# Patient Record
Sex: FEMALE | Race: WHITE | Employment: PART TIME | URBAN - METROPOLITAN AREA
[De-identification: names, ages, dates, MRNs, and addresses within clinical notes are randomized per-mention and may not be internally consistent; named-entity substitution may affect disease eponyms.]

---

## 2017-07-13 ENCOUNTER — OFFICE VISIT (OUTPATIENT)
Dept: ORTHOPEDIC SURGERY | Age: 37
End: 2017-07-13

## 2017-07-13 ENCOUNTER — TELEPHONE (OUTPATIENT)
Dept: ORTHOPEDIC SURGERY | Age: 37
End: 2017-07-13

## 2017-07-13 VITALS — WEIGHT: 130 LBS | RESPIRATION RATE: 16 BRPM | BODY MASS INDEX: 24.55 KG/M2 | HEIGHT: 61 IN

## 2017-07-13 DIAGNOSIS — R52 PAIN: ICD-10-CM

## 2017-07-13 DIAGNOSIS — M25.362 PATELLAR INSTABILITY OF LEFT KNEE: Primary | ICD-10-CM

## 2017-07-13 PROCEDURE — 99213 OFFICE O/P EST LOW 20 MIN: CPT | Performed by: ORTHOPAEDIC SURGERY

## 2017-07-13 PROCEDURE — 73562 X-RAY EXAM OF KNEE 3: CPT | Performed by: ORTHOPAEDIC SURGERY

## 2017-07-13 RX ORDER — VENLAFAXINE HYDROCHLORIDE 37.5 MG/1
CAPSULE, EXTENDED RELEASE ORAL
COMMUNITY
Start: 2017-05-03 | End: 2017-08-28

## 2017-07-13 RX ORDER — ESCITALOPRAM OXALATE 10 MG/1
TABLET ORAL
COMMUNITY
Start: 2017-04-13 | End: 2017-08-22 | Stop reason: ALTCHOICE

## 2017-07-13 ASSESSMENT — ENCOUNTER SYMPTOMS
RESPIRATORY NEGATIVE: 1
GASTROINTESTINAL NEGATIVE: 1
EYES NEGATIVE: 1

## 2017-08-22 ENCOUNTER — OFFICE VISIT (OUTPATIENT)
Dept: ORTHOPEDIC SURGERY | Age: 37
End: 2017-08-22

## 2017-08-22 VITALS
BODY MASS INDEX: 24.55 KG/M2 | HEIGHT: 61 IN | RESPIRATION RATE: 16 BRPM | HEART RATE: 66 BPM | WEIGHT: 130 LBS | DIASTOLIC BLOOD PRESSURE: 77 MMHG | SYSTOLIC BLOOD PRESSURE: 119 MMHG

## 2017-08-22 DIAGNOSIS — M25.362 PATELLAR INSTABILITY OF LEFT KNEE: Primary | ICD-10-CM

## 2017-08-22 PROCEDURE — 99213 OFFICE O/P EST LOW 20 MIN: CPT | Performed by: ORTHOPAEDIC SURGERY

## 2017-08-22 RX ORDER — OXYCODONE HYDROCHLORIDE AND ACETAMINOPHEN 5; 325 MG/1; MG/1
TABLET ORAL
Qty: 40 TABLET | Refills: 0 | Status: SHIPPED | OUTPATIENT
Start: 2017-08-22 | End: 2018-01-25 | Stop reason: ALTCHOICE

## 2017-08-22 RX ORDER — ONDANSETRON 4 MG/1
4 TABLET, FILM COATED ORAL EVERY 8 HOURS PRN
Qty: 5 TABLET | Refills: 0 | Status: SHIPPED | OUTPATIENT
Start: 2017-08-22 | End: 2018-01-25 | Stop reason: ALTCHOICE

## 2017-08-28 ENCOUNTER — HOSPITAL ENCOUNTER (OUTPATIENT)
Dept: PREADMISSION TESTING | Age: 37
Discharge: HOME | End: 2017-08-28
Attending: ORTHOPAEDIC SURGERY | Admitting: ORTHOPAEDIC SURGERY

## 2017-08-28 VITALS
BODY MASS INDEX: 27.28 KG/M2 | TEMPERATURE: 98.2 F | HEART RATE: 72 BPM | SYSTOLIC BLOOD PRESSURE: 112 MMHG | WEIGHT: 144.5 LBS | OXYGEN SATURATION: 98 % | HEIGHT: 61 IN | RESPIRATION RATE: 16 BRPM | DIASTOLIC BLOOD PRESSURE: 69 MMHG

## 2017-08-28 LAB
ANION GAP SERPL CALCULATED.3IONS-SCNC: 13 MMOL/L (ref 4–16)
BASOPHILS ABSOLUTE: 0.1 K/CU MM
BASOPHILS RELATIVE PERCENT: 0.6 % (ref 0–1)
BUN BLDV-MCNC: 10 MG/DL (ref 6–23)
CALCIUM SERPL-MCNC: 9.3 MG/DL (ref 8.3–10.6)
CHLORIDE BLD-SCNC: 101 MMOL/L (ref 99–110)
CO2: 22 MMOL/L (ref 21–32)
CREAT SERPL-MCNC: 0.7 MG/DL (ref 0.6–1.1)
DIFFERENTIAL TYPE: ABNORMAL
EOSINOPHILS ABSOLUTE: 0.1 K/CU MM
EOSINOPHILS RELATIVE PERCENT: 1 % (ref 0–3)
GFR AFRICAN AMERICAN: >60 ML/MIN/1.73M2
GFR NON-AFRICAN AMERICAN: >60 ML/MIN/1.73M2
GLUCOSE BLD-MCNC: 91 MG/DL (ref 70–140)
HCT VFR BLD CALC: 42.9 % (ref 37–47)
HEMOGLOBIN: 14 GM/DL (ref 12.5–16)
IMMATURE NEUTROPHIL %: 0.4 % (ref 0–0.43)
LYMPHOCYTES ABSOLUTE: 2.9 K/CU MM
LYMPHOCYTES RELATIVE PERCENT: 32.5 % (ref 24–44)
MCH RBC QN AUTO: 31.7 PG (ref 27–31)
MCHC RBC AUTO-ENTMCNC: 32.6 % (ref 32–36)
MCV RBC AUTO: 97.3 FL (ref 78–100)
MONOCYTES ABSOLUTE: 0.4 K/CU MM
MONOCYTES RELATIVE PERCENT: 4.7 % (ref 0–4)
NUCLEATED RBC %: 0 %
PDW BLD-RTO: 12.9 % (ref 11.7–14.9)
PLATELET # BLD: 280 K/CU MM (ref 140–440)
PMV BLD AUTO: 11.2 FL (ref 7.5–11.1)
POTASSIUM SERPL-SCNC: 4.6 MMOL/L (ref 3.5–5.1)
RBC # BLD: 4.41 M/CU MM (ref 4.2–5.4)
SEGMENTED NEUTROPHILS ABSOLUTE COUNT: 5.5 K/CU MM
SEGMENTED NEUTROPHILS RELATIVE PERCENT: 60.8 % (ref 36–66)
SODIUM BLD-SCNC: 136 MMOL/L (ref 135–145)
TOTAL IMMATURE NEUTOROPHIL: 0.04 K/CU MM
TOTAL NUCLEATED RBC: 0 K/CU MM
WBC # BLD: 9.1 K/CU MM (ref 4–10.5)

## 2017-08-28 ASSESSMENT — PAIN SCALES - GENERAL: PAINLEVEL_OUTOF10: 0

## 2017-08-29 ENCOUNTER — TELEPHONE (OUTPATIENT)
Dept: ORTHOPEDIC SURGERY | Age: 37
End: 2017-08-29

## 2017-08-29 DIAGNOSIS — M25.362 PATELLAR INSTABILITY OF LEFT KNEE: Primary | ICD-10-CM

## 2017-08-30 ENCOUNTER — HOSPITAL ENCOUNTER (OUTPATIENT)
Dept: SURGERY | Age: 37
Discharge: HOME | End: 2017-08-30
Attending: ORTHOPAEDIC SURGERY | Admitting: ORTHOPAEDIC SURGERY

## 2017-08-30 VITALS
OXYGEN SATURATION: 97 % | WEIGHT: 146 LBS | BODY MASS INDEX: 27.56 KG/M2 | DIASTOLIC BLOOD PRESSURE: 78 MMHG | RESPIRATION RATE: 16 BRPM | HEIGHT: 61 IN | SYSTOLIC BLOOD PRESSURE: 116 MMHG | TEMPERATURE: 98 F | HEART RATE: 97 BPM

## 2017-08-30 PROCEDURE — 27422 REVISION OF UNSTABLE KNEECAP: CPT | Performed by: ORTHOPAEDIC SURGERY

## 2017-08-30 PROCEDURE — 29877 ARTHRS KNEE SURG DBRDMT/SHVG: CPT | Performed by: ORTHOPAEDIC SURGERY

## 2017-08-30 RX ORDER — DIPHENHYDRAMINE HYDROCHLORIDE 50 MG/ML
12.5 INJECTION INTRAMUSCULAR; INTRAVENOUS
Status: ACTIVE | OUTPATIENT
Start: 2017-08-30 | End: 2017-08-30

## 2017-08-30 RX ORDER — SODIUM CHLORIDE, SODIUM LACTATE, POTASSIUM CHLORIDE, CALCIUM CHLORIDE 600; 310; 30; 20 MG/100ML; MG/100ML; MG/100ML; MG/100ML
INJECTION, SOLUTION INTRAVENOUS CONTINUOUS
Status: DISCONTINUED | OUTPATIENT
Start: 2017-08-30 | End: 2017-08-31 | Stop reason: HOSPADM

## 2017-08-30 RX ORDER — DOCUSATE SODIUM 100 MG/1
100 CAPSULE, LIQUID FILLED ORAL 2 TIMES DAILY
Status: DISCONTINUED | OUTPATIENT
Start: 2017-08-30 | End: 2017-08-31 | Stop reason: HOSPADM

## 2017-08-30 RX ORDER — FENTANYL CITRATE 50 UG/ML
25 INJECTION, SOLUTION INTRAMUSCULAR; INTRAVENOUS EVERY 5 MIN PRN
Status: DISCONTINUED | OUTPATIENT
Start: 2017-08-30 | End: 2017-08-31 | Stop reason: HOSPADM

## 2017-08-30 RX ORDER — SODIUM CHLORIDE 0.9 % (FLUSH) 0.9 %
10 SYRINGE (ML) INJECTION PRN
Status: DISCONTINUED | OUTPATIENT
Start: 2017-08-30 | End: 2017-08-31 | Stop reason: HOSPADM

## 2017-08-30 RX ORDER — SODIUM CHLORIDE 0.9 % (FLUSH) 0.9 %
10 SYRINGE (ML) INJECTION EVERY 12 HOURS SCHEDULED
Status: DISCONTINUED | OUTPATIENT
Start: 2017-08-30 | End: 2017-08-31 | Stop reason: HOSPADM

## 2017-08-30 RX ORDER — OXYCODONE HYDROCHLORIDE AND ACETAMINOPHEN 5; 325 MG/1; MG/1
1 TABLET ORAL EVERY 4 HOURS PRN
Status: DISCONTINUED | OUTPATIENT
Start: 2017-08-30 | End: 2017-08-31 | Stop reason: HOSPADM

## 2017-08-30 RX ORDER — ASPIRIN 325 MG
325 TABLET, DELAYED RELEASE (ENTERIC COATED) ORAL DAILY
Qty: 10 TABLET | Refills: 0 | Status: SHIPPED | OUTPATIENT
Start: 2017-08-30 | End: 2018-01-25 | Stop reason: ALTCHOICE

## 2017-08-30 RX ORDER — MEPERIDINE HYDROCHLORIDE 25 MG/ML
12.5 INJECTION INTRAMUSCULAR; INTRAVENOUS; SUBCUTANEOUS ONCE
Status: COMPLETED | OUTPATIENT
Start: 2017-08-30 | End: 2017-08-30

## 2017-08-30 RX ORDER — ONDANSETRON 2 MG/ML
4 INJECTION INTRAMUSCULAR; INTRAVENOUS
Status: ACTIVE | OUTPATIENT
Start: 2017-08-30 | End: 2017-08-30

## 2017-08-30 RX ORDER — OXYCODONE HYDROCHLORIDE AND ACETAMINOPHEN 5; 325 MG/1; MG/1
2 TABLET ORAL EVERY 4 HOURS PRN
Status: DISCONTINUED | OUTPATIENT
Start: 2017-08-30 | End: 2017-08-31 | Stop reason: HOSPADM

## 2017-08-30 RX ORDER — ONDANSETRON 2 MG/ML
4 INJECTION INTRAMUSCULAR; INTRAVENOUS EVERY 6 HOURS PRN
Status: DISCONTINUED | OUTPATIENT
Start: 2017-08-30 | End: 2017-08-31 | Stop reason: HOSPADM

## 2017-08-30 RX ADMIN — MEPERIDINE HYDROCHLORIDE 12.5 MG: 25 INJECTION INTRAMUSCULAR; INTRAVENOUS; SUBCUTANEOUS at 13:22

## 2017-08-30 RX ADMIN — Medication 0.5 MG: at 13:12

## 2017-08-30 RX ADMIN — OXYCODONE HYDROCHLORIDE AND ACETAMINOPHEN 2 TABLET: 5; 325 TABLET ORAL at 13:35

## 2017-08-30 RX ADMIN — Medication 0.5 MG: at 13:20

## 2017-08-30 RX ADMIN — SODIUM CHLORIDE, SODIUM LACTATE, POTASSIUM CHLORIDE, CALCIUM CHLORIDE: 600; 310; 30; 20 INJECTION, SOLUTION INTRAVENOUS at 09:54

## 2017-08-30 ASSESSMENT — PAIN DESCRIPTION - PAIN TYPE
TYPE: SURGICAL PAIN

## 2017-08-30 ASSESSMENT — PAIN DESCRIPTION - LOCATION
LOCATION: KNEE

## 2017-08-30 ASSESSMENT — PAIN SCALES - GENERAL
PAINLEVEL_OUTOF10: 9
PAINLEVEL_OUTOF10: 7
PAINLEVEL_OUTOF10: 10
PAINLEVEL_OUTOF10: 5
PAINLEVEL_OUTOF10: 9
PAINLEVEL_OUTOF10: 8
PAINLEVEL_OUTOF10: 10
PAINLEVEL_OUTOF10: 7

## 2017-08-30 ASSESSMENT — ENCOUNTER SYMPTOMS
DYSPNEA ACTIVITY LEVEL: NO INTERVAL CHANGE
SHORTNESS OF BREATH: 1

## 2017-08-30 ASSESSMENT — PAIN DESCRIPTION - DESCRIPTORS
DESCRIPTORS: ACHING;SHARP

## 2017-08-30 ASSESSMENT — PAIN DESCRIPTION - ORIENTATION
ORIENTATION: LEFT

## 2017-08-30 ASSESSMENT — PAIN - FUNCTIONAL ASSESSMENT: PAIN_FUNCTIONAL_ASSESSMENT: 0-10

## 2017-08-31 ENCOUNTER — HOSPITAL ENCOUNTER (OUTPATIENT)
Dept: PHYSICAL THERAPY | Age: 37
Discharge: HOME | End: 2017-08-31
Attending: ORTHOPAEDIC SURGERY | Admitting: ORTHOPAEDIC SURGERY

## 2017-08-31 ENCOUNTER — OFFICE VISIT (OUTPATIENT)
Dept: ORTHOPEDIC SURGERY | Age: 37
End: 2017-08-31

## 2017-08-31 VITALS — BODY MASS INDEX: 27.56 KG/M2 | WEIGHT: 146 LBS | RESPIRATION RATE: 16 BRPM | HEIGHT: 61 IN

## 2017-08-31 DIAGNOSIS — Z09 POSTOP CHECK: Primary | ICD-10-CM

## 2017-08-31 DIAGNOSIS — M25.362 PATELLAR INSTABILITY OF LEFT KNEE: ICD-10-CM

## 2017-08-31 PROCEDURE — 99024 POSTOP FOLLOW-UP VISIT: CPT | Performed by: ORTHOPAEDIC SURGERY

## 2017-08-31 ASSESSMENT — PAIN DESCRIPTION - LOCATION: LOCATION: KNEE

## 2017-08-31 ASSESSMENT — PAIN DESCRIPTION - PAIN TYPE: TYPE: SURGICAL PAIN

## 2017-08-31 ASSESSMENT — PAIN SCALES - GENERAL: PAINLEVEL_OUTOF10: 9

## 2017-08-31 ASSESSMENT — ACTIVITIES OF DAILY LIVING (ADL): EFFECT OF PAIN ON DAILY ACTIVITIES: ALL FUNCTIONAL MOBILITY

## 2017-08-31 ASSESSMENT — PAIN DESCRIPTION - ONSET: ONSET: SUDDEN

## 2017-08-31 ASSESSMENT — PAIN DESCRIPTION - PROGRESSION: CLINICAL_PROGRESSION: NOT CHANGED

## 2017-08-31 ASSESSMENT — PAIN DESCRIPTION - DESCRIPTORS: DESCRIPTORS: CONSTANT;SHARP

## 2017-08-31 ASSESSMENT — PAIN DESCRIPTION - ORIENTATION: ORIENTATION: LEFT;ANTERIOR

## 2017-08-31 ASSESSMENT — PAIN DESCRIPTION - FREQUENCY: FREQUENCY: CONTINUOUS

## 2017-09-01 ENCOUNTER — HOSPITAL ENCOUNTER (OUTPATIENT)
Dept: OTHER | Age: 37
Discharge: HOME | End: 2017-09-30
Attending: ORTHOPAEDIC SURGERY | Admitting: ORTHOPAEDIC SURGERY

## 2017-09-08 ENCOUNTER — TELEPHONE (OUTPATIENT)
Dept: ORTHOPEDIC SURGERY | Age: 37
End: 2017-09-08

## 2017-09-11 RX ORDER — OXYCODONE HYDROCHLORIDE AND ACETAMINOPHEN 5; 325 MG/1; MG/1
1 TABLET ORAL EVERY 8 HOURS PRN
Qty: 15 TABLET | Refills: 0 | Status: SHIPPED | OUTPATIENT
Start: 2017-09-11 | End: 2018-01-25 | Stop reason: ALTCHOICE

## 2017-09-12 ENCOUNTER — OFFICE VISIT (OUTPATIENT)
Dept: ORTHOPEDIC SURGERY | Age: 37
End: 2017-09-12

## 2017-09-12 VITALS — BODY MASS INDEX: 27.56 KG/M2 | RESPIRATION RATE: 16 BRPM | HEIGHT: 61 IN | WEIGHT: 146 LBS

## 2017-09-12 DIAGNOSIS — Z09 POSTOP CHECK: Primary | ICD-10-CM

## 2017-09-12 DIAGNOSIS — M25.362 PATELLAR INSTABILITY OF LEFT KNEE: ICD-10-CM

## 2017-09-12 PROCEDURE — 99024 POSTOP FOLLOW-UP VISIT: CPT | Performed by: ORTHOPAEDIC SURGERY

## 2017-09-21 ENCOUNTER — HOSPITAL ENCOUNTER (OUTPATIENT)
Dept: PHYSICAL THERAPY | Age: 37
Discharge: HOME | End: 2017-09-21
Admitting: ORTHOPAEDIC SURGERY

## 2017-10-01 ENCOUNTER — HOSPITAL ENCOUNTER (OUTPATIENT)
Dept: OTHER | Age: 37
Discharge: HOME | End: 2017-10-31
Attending: ORTHOPAEDIC SURGERY | Admitting: ORTHOPAEDIC SURGERY

## 2017-10-05 ENCOUNTER — HOSPITAL ENCOUNTER (OUTPATIENT)
Dept: PHYSICAL THERAPY | Age: 37
Discharge: HOME | End: 2017-10-05
Admitting: ORTHOPAEDIC SURGERY

## 2017-10-05 NOTE — FLOWSHEET NOTE
Outpatient Physical Therapy           [facility]           [] Phone: [phone]   Fax: [phone]  [facility] pat[facility]           [] Phone: [phone]   Fax: [phone]    Physical Therapy Daily Treatment Note  Date:  10/5/2017    Patient Name:  Jahaira Mascorro    :  1980  MRN: [id number]  Restrictions/Precautions: WBAT w/ brace  Diagnosis:   LEFT KNEE ARTHROSCOPY, CHONDROPLASTY, OPEN PROXIMAL PATELLA      Date of Surgery: 17  Treatment Diagnosis: L knee pain, stiffness. Gait dysfunction     Insurance/Certification information:  [employer]   Referring Physician:  Dr. Franky Esparza   Next Doctor Visit:  --  Plan of care signed (Y/N): YES  Visit# / total visits:   per new POC  Pain level: 0/10 currently    Goals:        Short term goals  Time Frame for Short term goals: 4 weeks 10/1/17  Short term goal 1: Pt will demo >100 deg knee flexion with full knee ext to ease return to stair negotiation. Mostly MET  Short term goal 2: Pt will have d/c crutches and ambulating with knee brace. Not MET  Short term goal 3: Pt reports >50% reduction in pain to ease sleeping. MET  Short term goal 4: Pt will demo good quad activation with min/no pain. Partially MET   Long term goals   Time Frame for Long term goals : 8 weeks 17 (All Goals NOT MET)  Long term goal 1: Pt goal: return to normal gait with no pain  Long term goal 2: Pt will demo I with HEP/symptom management. Long term goal 3: Pt will demo normal gait on even groud and stairs to ease functional mobility. Long term goal 4: Pt will demo >4+/5 knee/hip strength to ease squats.    Long term goal 5: Pt will demo full knee AROM to ease transfers        G-Code Selection: (On Eval and every 10th visit or Discharge)    MEASURE    [x] Mobility: Walking and Moving Around     [x] Current ()   [x] Goal ()   [] DC ()  [] Changing/Maintaining Body Position     [] Current (7390)      [] Goal ()   [] DC ()  [] Carrying / Moving / Handling Objects [] Current ()   [] Goal ()   [] DC ()  [] Self-Care     [] Current ()   [] Goal ()   [] DC ()  [] Other PT/OT primary DX     [] Current ()   [] Goal ()   [] DC ()    SEVERITY    CURRENT  GOAL  DISCHARGE   [] CH (0% Impaired, Indep.)  [] CI (1-19% Impaired, SBA-CGA)  [] CJ (20-39% Impaired, MIN A)  [] CK  (40-59% Impairment, Mod A)  [x] CL  (60-79% Impairment, Max A)  [] CM  (80-99% Impairment, Dep.)   [] CN  (100% Impairment, Tot Dep.) [] CH (0% Impaired, Indep.)  [] CI (1-19% Impaired, SBA-CGA)  [x] CJ (20-39% Impaired, MIN A)  [] CK  (40-59% Impairment, Mod A)  [] CL  (60-79% Impairment, Max A)  [] CM  (80-99% Impairment, Dep.)   [] CN  (100% Impairment, Tot Dep.)  [] CH (0% Impaired, Indep.)  [] CI (1-19% Impaired, SBA-CGA)  [] CJ (20-39% Impaired, MIN A)  [] CK  (40-59% Impairment, Mod A)  [] CL  (60-79% Impairment, Max A)  [] CM  (80-99% Impairment, Dep.)   [] CN  (100% Impairment, Tot Dep.)          Subjective: Pt stated that she is currently no pain. Currently having R hip soreness. Notes having the next couple of days off. Increased pain after last visit at 4-5/10 with having to work later that night. Any changes in Ambulatory Summary Sheet? no    Objective:    Brace locked at 0 deg with B crutches entering therapy   Completed SAQ to 30 deg to reduce pain and pt sensation of pop   Observed fatigue and lack control with SAQ intermittent, improved with reps.           Exercises:  Exercise/Equipment 9/26/17 9/28/17 10/3/17 10/5/17   Nu-step -- -- 6'  Lv 1 Lv1  5'    Calf stretch man man     Calf raises  10x2 10x2       Quad set 10x2 5\"  10x2 5\"  10x2  3\"    Weight shifting  X 20 f/b and S/S 1' ea dir      Heelslide w/ sheet 10x2  10x2 GSB 10x2  RSB 10x2     SLR:   Flex  ABD  ext   In brace   ecentric lowering for SLR x 25  SL hip abd x 20  Prone ext x20 In brace   ecentric lowering for SLR x 25  SL hip abd x 20  Prone ext x20       Sitting at edge of bed PROM to 110 deg         AAROM LAQ  10x2 X 25 10x2      Sitting marches x25 x25 10x2 1# 10x     iso hamstring in sitting at 45 deg knee flexion          SAQ Black wedge w/assist  2x10 w/ eccentric lowering Black wedge w/assist 2x10  Black wedge w/assist 2x10  Black wedge AROM 2x10      Fitter knee ext    1 blue band 10x2 1 blue band 15x2    Bridge w/ RSB     10x2     LAQ    To 40 deg ext 10x2  1#      iso at 20 deg knee 10x2  3\"     Resisted hamstring curls    YTB 10x2                                         Other Therapeutic Activities/Education: --    Home Exercise Program:  Continue current program. Pt agreed to comply. Modality/intervention used:    [x] Therapeutic Exercise  [] Modalities:  [] Therapeutic Activity     [] Ultrasound  [x] Elec  Stim  [] Gait Training      [] Cervical Traction [] Lumbar Traction  [] Neuromuscular Re-education    [] Cold/hotpack [] Iontophoresis   [x] Instruction in HEP      [x] Vasopneumatic     [] Manual Therapy               [] Aquatic Therapy     Manual Treatments  --  Modalities:  Gameready to L knee in supine  36 deg, low pressure for pain management x 15'     Communication with other providers:  POC sent 8/31/17    Education provided to patient/caregiver:  Ice as needed. Adverse reactions to treatment:  --    Equipment provided:  --    Assessment:   Pt tolerated treatment fairly well Added additional exercises with light added resistance, appeared to tolerate well. Will assess for adverse effects next visit. Pt is making gains with control and strength of L LE. Pt given opportunity to increase frequency to 3x a week if wanted. Possible consideration per patient. Pt rated pain at 0-1/10 after vaso. Pt will continue to benefit from more strengthening to allow patient to return to PLOF.      Time In / Time Out:  1347/1438     Timed Code/Total Treatment Minutes: 41/51'    1 vaso ( 10')    3 TE (41')   Patients Report of Tolerance:    [] Patient limited by fatigue

## 2017-10-10 ENCOUNTER — OFFICE VISIT (OUTPATIENT)
Dept: ORTHOPEDIC SURGERY | Age: 37
End: 2017-10-10

## 2017-10-10 ENCOUNTER — HOSPITAL ENCOUNTER (OUTPATIENT)
Dept: PHYSICAL THERAPY | Age: 37
Discharge: HOME | End: 2017-10-10
Admitting: ORTHOPAEDIC SURGERY

## 2017-10-10 VITALS — HEIGHT: 61 IN | BODY MASS INDEX: 27.56 KG/M2 | RESPIRATION RATE: 16 BRPM | WEIGHT: 146 LBS

## 2017-10-10 DIAGNOSIS — M25.362 PATELLAR INSTABILITY OF LEFT KNEE: ICD-10-CM

## 2017-10-10 DIAGNOSIS — Z09 POSTOP CHECK: Primary | ICD-10-CM

## 2017-10-10 PROCEDURE — 99024 POSTOP FOLLOW-UP VISIT: CPT | Performed by: ORTHOPAEDIC SURGERY

## 2017-10-10 NOTE — PROGRESS NOTES
Scribe Authentication Statement  Fifi Yates, scribed portions of this documentation for and in the presence of Dr. Diamond Alba on 10/10/17 at 1:52 PM.    Provider Malik Ames M.D., personally performed the services described in this documentation and they were scribed in my presence by the above listed scribe. The documentation is both accurate and complete. lEi Gaston  [id number]  October 10, 2017    Chief Complaint   Patient presents with    Post-Op Check     left knee        Date of surgery:   August 30th 2017    History:  Ms. Eli Gaston is here in follow up regarding her left knee:  Left knee arthroscopic patellar chondroplasty,  open proximal patellar realignment.     She is doing ok, is still having 2-3/10 pain. She has not discontinued the assistive devices for ambulation. She is still wearing ROM brace locked in extension. She denies chest pain, SOB, calf pain. No other issues. The patient is still in therapy, and is still having trouble firing her quads. Physical: She demonstrates appropriate mood and affect. The left leg exam:  The incisions are clean, dry, intact and nontender and well  healed. She has mild swelling. There are No cords or calf tenderness. No significant calf/ankle edema. She is neurovascularly intact distally.   Knee ROM is 0-110, patella stable to lateral glide with 20% glide at 30 degrees of flexion    Impression: Status post above, mild weakness/quad inhibition     Plan:   Wean off crutches by this weekend  Wean off brace  By next weekend   continue the PT protocol with emphasis on ROM/strengthening  follow up in 4 weeks

## 2017-10-10 NOTE — FLOWSHEET NOTE
Outpatient Physical Therapy           [facility]           [] Phone: [phone]   Fax: [phone]  nimes[facility]           [] Phone: [phone]   Fax: [phone]    Physical Therapy Daily Treatment Note  Date:  10/10/2017    Patient Name:  Miguelito Salazar    :  1980  MRN: [id number]  Restrictions/Precautions: WBAT w/ brace  Diagnosis:   LEFT KNEE ARTHROSCOPY, CHONDROPLASTY, OPEN PROXIMAL PATELLA      Date of Surgery: 17  Treatment Diagnosis: L knee pain, stiffness. Gait dysfunction     Insurance/Certification information:  [employer]ussain   Referring Physician:  Dr. Stormy Coley   Next Doctor Visit:  --  Plan of care signed (Y/N): YES  Visit# / total visits:   per new POC  Pain level: 2/10 currently    Goals:        Short term goals  Time Frame for Short term goals: 4 weeks 10/1/17  Short term goal 1: Pt will demo >100 deg knee flexion with full knee ext to ease return to stair negotiation. Mostly MET  Short term goal 2: Pt will have d/c crutches and ambulating with knee brace. Not MET  Short term goal 3: Pt reports >50% reduction in pain to ease sleeping. MET  Short term goal 4: Pt will demo good quad activation with min/no pain. Partially MET   Long term goals   Time Frame for Long term goals : 8 weeks 17 (All Goals NOT MET)  Long term goal 1: Pt goal: return to normal gait with no pain  Long term goal 2: Pt will demo I with HEP/symptom management. Long term goal 3: Pt will demo normal gait on even groud and stairs to ease functional mobility. Long term goal 4: Pt will demo >4+/5 knee/hip strength to ease squats.    Long term goal 5: Pt will demo full knee AROM to ease transfers        G-Code Selection: (On Eval and every 10th visit or Discharge)    MEASURE    [x] Mobility: Walking and Moving Around     [x] Current ()   [x] Goal ()   [] DC ()  [] Changing/Maintaining Body Position     [] Current (0942)      [] Goal ()   [] DC ()  [] Carrying / Moving / Handling pain   [] Patient limited by other medical complications   [] Other:     Prognosis:   [x] Good [] Fair  [] Poor    Plan:   [x] Continue per plan of care [] Alter current plan (see comments)  [] Plan of care initiated [] Hold pending MD visit [] Discharge    Plan for Next Session:  follow protocol. AROM knee flex/ext and isometrics, manual PROM within tolerance. Target hips iso->AROM, DF flexibility. Nu-step if able. Gameready.          Next Progress Note due:  Radha 8/31/17    Visit 10            Electronically signed by: Stuart Squires PT 10/10/2017   6:58 AM      10/10/2017 6:58 AM

## 2017-10-10 NOTE — PATIENT INSTRUCTIONS
Wean off crutches by this weekend  Wean off brace  By next weekend   continue the PT protocol with emphasis on ROM  follow up in 4 weeks

## 2017-10-13 ENCOUNTER — HOSPITAL ENCOUNTER (OUTPATIENT)
Dept: PHYSICAL THERAPY | Age: 37
Discharge: HOME | End: 2017-10-13
Admitting: ORTHOPAEDIC SURGERY

## 2017-10-13 NOTE — FLOWSHEET NOTE
Lv1  5'  L 1 x 5'     Calf stretch       Calf raises          Quad set   W/ SLR and SAQ    Weight shifting         Heelslide w/ sheet RSB 10x2 RSB 10x2 20x   AAROM       SLR:   Flex  ABD  ext  AAROM 10x2 Brace locked for SLR 10x2  No brace for SL hip abd 10x2      Sitting at edge of bed PROM to 110 deg         AAROM LAQ          Sitting marches 1# 10x 2# 10x2 --      iso hamstring in sitting at 45 deg knee flexion          SAQ Black wedge AROM 2x10  Foam  6\" 10x2 w/ MA at end range    Eccentric lowering 10x2      Fitter knee ext  1 blue band 15x2 1 blue band 15x2 --     Bridge w/ RSB  10x2        LAQ To 40 deg ext 10x2  1#      iso at 20 deg knee 10x2  3\" To 40 deg ext 10x2  To 40 deg ext 10x2       Resisted hamstring curls YTB 10x2 YTB 10x2       Sit to stand from elevated table  10x2 Mini squat 10x2  With UE support     Shuttle Walking    1c 10x , 2c 10  1C  10x 2  2C 10x      GT training   In clinic w/ brace and no crutches x 2 laps with even stride and heel toe gt               Other Therapeutic Activities/Education:   Home Exercise Program:  Continue current program. Added light sit to stand. Pt agreed to comply. Modality/intervention used:    [x] Therapeutic Exercise  [] Modalities:  [] Therapeutic Activity     [] Ultrasound  [] Elec  Stim  [x] Gait Training      [] Cervical Traction [] Lumbar Traction  [] Neuromuscular Re-education    [] Cold/hotpack [] Iontophoresis   [x] Instruction in HEP      [] Vasopneumatic     [x] Manual Therapy               [] Aquatic Therapy     Manual Treatments AAROM for knee flexion x 10'   Modalities: Ice to go    Communication with other providers:  POC sent 8/31/17    Education provided to patient/caregiver:  Ice as needed. Adverse reactions to treatment:  --    Equipment provided:  --    Assessment:   Pt tolerated treatment fair. Pt voiced being anxious about weaning off crutches and brace. Pt continues to have decreased quad control with SLR and SAQ.  Pt was unable to lift L LE from off of shuttle and needed MA. Pt rated pain at 1/10 an stated that she had more confidence to walk without crutches now. Time In / Time Out:  1118/1218    Timed Code/Total Treatment Minutes:   60'/ 61' 1 man ( 10') 1 neuro ( 10') 1 GT ( 15') 1 TE ( 25')   Patients Report of Tolerance:    [] Patient limited by fatigue        [x] Patient limited by pain   [] Patient limited by other medical complications   [] Other:     Prognosis:   [x] Good [] Fair  [] Poor    Plan:   [x] Continue per plan of care [] Alter current plan (see comments)  [] Plan of care initiated [] Hold pending MD visit [] Discharge    Plan for Next Session:  follow protocol. AROM knee flex/ext and isometrics, manual PROM within tolerance. Target hips iso->AROM, DF flexibility. Nu-step if able. Gameready.          Next Progress Note due:  Radha 8/31/17    Visit 10            Electronically signed by: Juventino Herrera, VANESA 10/13/2017   9:56 AM          10/13/2017,5:31 PM

## 2017-10-17 ENCOUNTER — HOSPITAL ENCOUNTER (OUTPATIENT)
Dept: PHYSICAL THERAPY | Age: 37
Discharge: HOME | End: 2017-10-17
Admitting: ORTHOPAEDIC SURGERY

## 2017-10-17 NOTE — FLOWSHEET NOTE
Objects     [] Current ()   [] Goal ()   [] DC ()  [] Self-Care     [] Current ()   [] Goal ()   [] DC ()  [] Other PT/OT primary DX     [] Current ()   [] Goal ()   [] DC ()    SEVERITY    CURRENT  GOAL  DISCHARGE   [] CH (0% Impaired, Indep.)  [] CI (1-19% Impaired, SBA-CGA)  [] CJ (20-39% Impaired, MIN A)  [] CK  (40-59% Impairment, Mod A)  [x] CL  (60-79% Impairment, Max A)  [] CM  (80-99% Impairment, Dep.)   [] CN  (100% Impairment, Tot Dep.) [] CH (0% Impaired, Indep.)  [] CI (1-19% Impaired, SBA-CGA)  [x] CJ (20-39% Impaired, MIN A)  [] CK  (40-59% Impairment, Mod A)  [] CL  (60-79% Impairment, Max A)  [] CM  (80-99% Impairment, Dep.)   [] CN  (100% Impairment, Tot Dep.)  [] CH (0% Impaired, Indep.)  [] CI (1-19% Impaired, SBA-CGA)  [] CJ (20-39% Impaired, MIN A)  [] CK  (40-59% Impairment, Mod A)  [] CL  (60-79% Impairment, Max A)  [] CM  (80-99% Impairment, Dep.)   [] CN  (100% Impairment, Tot Dep.)          Subjective: Pt stated that her pain was 1/10 today. Pt states has been walking without crutches for past 4 days. States has been walking without brace in home. Did not ice yesterday. Completing HEP. Off from work this date. Wore brace and took single crutch at [facility] for a few hours with difficulty post. Difficulties with decline. Any changes in Ambulatory Summary Sheet? no    Objective:    Brace locked at 0-20 deg with no crutches entering therapy   Ambulated within tx without brace with slight antalgic gait. AROM knee flexion to 110 degrees on Shuttle.        Exercises:  Exercise/Equipment 10/5/17 10/10/17 10/13/17 10/17/17   Nu-step Lv1  5'  Lv1  5'  L 1 x 5'  L 1 x 5'    Calf stretch       Calf raises          Quad set   W/ SLR and SAQ    Weight shifting         Heelslide w/ sheet RSB 10x2 RSB 10x2 20x   AAROM       SLR:   Flex  ABD  ext  AAROM 10x2 Brace locked for SLR 10x2  No brace for SL hip abd 10x2      Sitting at edge of

## 2017-10-20 ENCOUNTER — HOSPITAL ENCOUNTER (OUTPATIENT)
Dept: PHYSICAL THERAPY | Age: 37
Discharge: HOME | End: 2017-10-20
Admitting: ORTHOPAEDIC SURGERY

## 2017-10-20 NOTE — FLOWSHEET NOTE
Quad set     W/ SLR and SAQ      Weight shifting              Heelslide w/ sheet RSB 10x2 RSB 10x2 20x   AAROM         SLR:   Flex  ABD  ext   AAROM 10x2 Brace locked for SLR 10x2  No brace for SL hip abd 10x2        Sitting at edge of bed PROM to 110 deg              AAROM LAQ               Sitting marches 1# 10x 2# 10x2 -- 3# 15x2 3# 15x 2     iso hamstring in sitting at 45 deg knee flexion               SAQ Black wedge AROM 2x10  Foam  6\" 10x2 w/ MA at end range     Eccentric lowering 10x2 10x2 to end range  10 x 2 to end range     Fitter knee ext  1 blue band 15x2 1 blue band 15x2 --       Bridge w/ RSB  10x2            LAQ To 40 deg ext 10x2  1#        iso at 20 deg knee 10x2  3\" To 40 deg ext 10x2  To 40 deg ext 10x2  To 40 deg ext 15x2   3#       Resisted hamstring curls YTB 10x2 YTB 10x2          Sit to stand from elevated table   10x2 Mini squat 10x2  With UE support       Shuttle Walking     1c 10x , 2c 10  1C  10x 2  2C 10x        GT training     In clinic w/ brace and no crutches x 2 laps with even stride and heel toe gt        Shuttle Leg press       2c 15x2 2c 15x2   FT bwd walking        2 plts 4x 2 plts 4x   Marches on Airex       30x2 30 x2   DF calf stretch        FR 20\"x4 FR 20\"x4                   Other Therapeutic Activities/Education: Continue walking as tolerated. Education given regarding increased pain and use of brace if pain elevates significantly.  Pt reporting pain / swelling with education regarding icing and elevating.      Home Exercise Program:  Continue current program. Pt agreed to comply.       Modality/intervention used:    [x] Therapeutic Exercise                                 [] Modalities:  [] Therapeutic Activity                                                         [] Ultrasound                                      [] Elec  Stim  [] Gait Training                                                                              [] Cervical Traction                  [] Lumbar Traction  [] Neuromuscular Re-education                                            [] Cold/hotpack            [] Iontophoresis             [x] Instruction in HEP                                                                                  [x] Vasopneumatic     [] Manual Therapy                                                                                              [] Aquatic Therapy                        Manual Treatments --     Modalities:  Gameready to L knee in supine  36 deg, med pressure for pain management x 10'      Communication with other providers:  POC sent 8/31/17     Education provided to patient/caregiver:  Ice as needed.      Adverse reactions to treatment:  --     Equipment provided:  --     Assessment:    Pt tolerated treatment well. Pt now able to ambulate without brace. Pt continues to demonstrate weakness in bilateral knees and will continue to address going forward to improve functional mobility for ambulation and working. Continue per POC. Pt rated pain p rx 0/10 (numb).      Time In / Time Out:  9634-1108       Timed Code/Total Treatment Minutes:    TE 35 (2), Vaso 10 (1)     Patients Report of Tolerance:    [] Patient limited by fatigue        [x] Patient limited by min pain                [] Patient limited by other medical complications   [] Other:      Prognosis:               [x] Good            [] Fair                                     [] Poor     Plan:                [x] Continue per plan of care                [] Alter current plan (see comments)  [] Plan of care initiated            [] Hold pending MD visit                      [] Discharge     Plan for Next Session:  follow protocol. AROM knee flex/ext as able ->closed chain, Target hips iso->AROM, DF flexibility. Nu-step.  Gameready.           Next Progress Note due:  Radha 8/31/17    Visit 10              Electronically signed by: Darren Ott PTA [id number] 10/17/2017   6:49 AM            10/17/2017 6:49 AM

## 2017-10-23 ENCOUNTER — HOSPITAL ENCOUNTER (OUTPATIENT)
Dept: PHYSICAL THERAPY | Age: 37
Discharge: HOME | End: 2017-10-23
Admitting: ORTHOPAEDIC SURGERY

## 2017-10-23 NOTE — FLOWSHEET NOTE
Self-Care     [] Current ()   [] Goal ()   [] DC ()  [] Other PT/OT primary DX     [] Current ()   [] Goal ()   [] DC ()     SEVERITY     CURRENT  GOAL  DISCHARGE   [] CH                (0% Impaired, Indep.)  [] CI                  (1-19% Impaired, SBA-CGA)  [] CJ                 (20-39% Impaired, MIN A)  [] CK                (40-59% Impairment, Mod A)  [x] CL                 (60-79% Impairment, Max A)  [] CM                (80-99% Impairment, Dep.)   [] CN                (100% Impairment, Tot Dep.) [] CH                (0% Impaired, Indep.)  [] CI                  (1-19% Impaired, SBA-CGA)  [x] CJ                 (20-39% Impaired, MIN A)  [] CK                (40-59% Impairment, Mod A)  [] CL                 (60-79% Impairment, Max A)  [] CM                (80-99% Impairment, Dep.)   [] CN                (100% Impairment, Tot Dep.)  [] CH                (0% Impaired, Indep.)  [] CI                  (1-19% Impaired, SBA-CGA)  [] CJ                 (20-39% Impaired, MIN A)  [] CK                (40-59% Impairment, Mod A)  [] CL                 (60-79% Impairment, Max A)  [] CM                (80-99% Impairment, Dep.)   [] CN                (100% Impairment, Tot Dep. )             Subjective: Pt  Stated that her pain was 2/10 today. Pt stated that she stopped wearing the brace last Thursday. Pt stated that she twisted her knee again while sleeping last night and that it was really painful.     Any changes in Ambulatory Summary Sheet? no     Objective:    Ambulated into clinic without brace with mod limp    AROM knee flexion to 123 degrees        Exercises:  Exercise/Equipment 10/5/17 10/10/17 10/13/17 10/17/17 10/20/2017 10/23/17   Nu-step Lv1  5'  Lv1  5'  L 1 x 5'  L 1 x 5'  L1 x5' L1 x5'   Calf stretch             Calf raises                Quad set     W/ SLR and SAQ    W/ SLR and SAQ   Weight shifting               Heelslide w/ sheet RSB 10x2 RSB 10x2 20x   AAROM     20x AAROM     SLR: Flex  ABD  ext   AAROM 10x2 Brace locked for SLR 10x2  No brace for SL hip abd 10x2    10x2 ea dir w/ MA PRN to keep extension     Sitting at edge of bed PROM to 110 deg               AAROM LAQ                Sitting marches 1# 10x 2# 10x2 -- 3# 15x2 3# 15x 2 3# 15x2     iso hamstring in sitting at 45 deg knee flexion                SAQ Black wedge AROM 2x10  Foam  6\" 10x2 w/ MA at end range     Eccentric lowering 10x2 10x2 to end range  10 x 2 to end range 10 x 2 to end range     Fitter knee ext  1 blue band 15x2 1 blue band 15x2 --        Bridge w/ RSB  10x2             LAQ To 40 deg ext 10x2  1#        iso at 20 deg knee 10x2  3\" To 40 deg ext 10x2  To 40 deg ext 10x2  To 40 deg ext 15x2   3#     To 40 deg ext   3#  15x2     Resisted hamstring curls YTB 10x2 YTB 10x2           Sit to stand from elevated table   10x2 Mini squat 10x2  With UE support        Shuttle Walking     1c 10x , 2c 10  1C  10x 2  2C 10x         GT training     In clinic w/ brace and no crutches x 2 laps with even stride and heel toe gt         Shuttle Leg press       2c 15x2 2c 15x2 2c 15x2   FT bwd walking        2 plts 4x 2 plts 4x  2 plts 5x   Marches on Airex       30x2 30 x2 30\"x2   DF calf stretch        FR 20\"x4 FR 20\"x4 1/2 roll 30\"x2                    Other Therapeutic Activities/Education: Continue walking as tolerated. Education given regarding increased pain and use of brace if pain elevates significantly.  Pt reporting pain / swelling with education regarding icing and elevating.      Home Exercise Program:  Continue current program. Pt agreed to comply.       Modality/intervention used:    [x] Therapeutic Exercise                                 [] Modalities:  [] Therapeutic Activity                                                         [] Ultrasound                                      [] Elec  Stim  [] Gait Training                                                                              [] Cervical Traction [] Lumbar Traction  [] Neuromuscular Re-education                                            [] Cold/hotpack            [] Iontophoresis             [x] Instruction in HEP                                                                                  [x] Vasopneumatic     [] Manual Therapy                                                                                              [] Aquatic Therapy                        Manual Treatments --     Modalities:  Gameready to L knee in supine  36 deg, med pressure for pain management x 10'      Communication with other providers:  POC sent 8/31/17     Education provided to patient/caregiver:  Ice as needed.      Adverse reactions to treatment:  --     Equipment provided:  --     Assessment:  Pt tolerated treatment fairly well. Pt continues to have weakness with quads and requires assist to perform SLR w/o extension lag.  Pt rated pain at 0/10 after vaso. Time In / Time Out:  1050/ 1145     Timed Code/Total Treatment Minutes: 45'/55' 1 neuro ( 10') 2 TE (35') 1 vaso ( 10')      Patients Report of Tolerance:    [] Patient limited by fatigue        [x] Patient limited by min pain                [] Patient limited by other medical complications   [] Other:      Prognosis:               [x] Good            [] Fair                                     [] Poor     Plan:                [x] Continue per plan of care                [] Alter current plan (see comments)  [] Plan of care initiated            [] Hold pending MD visit                      [] Discharge     Plan for Next Session:  follow protocol. AROM knee flex/ext as able ->closed chain, Target hips iso->AROM, DF flexibility. Nu-step.  Gameready.           Next Progress Note due:  Radha 8/31/17    Visit 10              Electronically signed by: Gail Roberts, VANESA 11:28 AM  , 10/23/17         10/23/2017,4:49 PM

## 2017-10-31 ENCOUNTER — HOSPITAL ENCOUNTER (OUTPATIENT)
Dept: PHYSICAL THERAPY | Age: 37
Discharge: HOME | End: 2017-10-31
Admitting: ORTHOPAEDIC SURGERY

## 2017-10-31 NOTE — FLOWSHEET NOTE
Physical Therapy Daily Treatment Note  Date:  10/17/2017     Patient Name:  Tavia Lowry                                        :  1980                                         MRN: [id number]  Restrictions/Precautions: WBAT w/ brace  Diagnosis:   LEFT KNEE ARTHROSCOPY, CHONDROPLASTY, OPEN PROXIMAL PATELLA      Date of Surgery: 17  Treatment Diagnosis: L knee pain, stiffness. Gait dysfunction     Insurance/Certification information:  [employer]   Referring Physician:  Dr. Mesfin Horner   Next Doctor Visit:  --  Plan of care signed (Y/N): YES  Visit# / total visits:   per new POC  Pain level:               1/10 currently    Goals:         Short term goals  Time Frame for Short term goals: 4 weeks 10/1/17  Short term goal 1: Pt will demo >100 deg knee flexion with full knee ext to ease return to stair negotiation. MET  Short term goal 2: Pt will have d/c crutches and ambulating with knee brace. MET  Short term goal 3: Pt reports >50% reduction in pain to ease sleeping. MET  Short term goal 4: Pt will demo good quad activation with min/no pain. Partially MET   Long term goals   Time Frame for Long term goals : 8 weeks 17   Long term goal 1: Pt goal: return to normal gait with no pain Progressing   Long term goal 2: Pt will demo I with HEP/symptom management. Progressing  Long term goal 3: Pt will demo normal gait on even groud and stairs to ease functional mobility. Progressing   Long term goal 4: Pt will demo >4+/5 knee/hip strength to ease squats.    Long term goal 5: Pt will demo full knee AROM to ease transfers         G-Code Selection: (On Eval and every 10th visit or Discharge)     MEASURE     [x] Mobility: Walking and Moving Around     [x] Current ()   [x] Goal ()   [] DC ()  [] Changing/Maintaining Body Position     [] Current (3383)      [] Goal ()   [] DC ()  [] Carrying / Moving / Handling Objects     [] Current ()   [] Goal ()   [] DC ()  [] Self-Care     [] Current ()   [] Goal ()   [] DC ()  [] Other PT/OT primary DX     [] Current ()   [] Goal ()   [] DC ()     SEVERITY     CURRENT  GOAL  DISCHARGE   [] CH                (0% Impaired, Indep.)  [] CI                  (1-19% Impaired, SBA-CGA)  [] CJ                 (20-39% Impaired, MIN A)  [] CK                (40-59% Impairment, Mod A)  [x] CL                 (60-79% Impairment, Max A)  [] CM                (80-99% Impairment, Dep.)   [] CN                (100% Impairment, Tot Dep.) [] CH                (0% Impaired, Indep.)  [] CI                  (1-19% Impaired, SBA-CGA)  [x] CJ                 (20-39% Impaired, MIN A)  [] CK                (40-59% Impairment, Mod A)  [] CL                 (60-79% Impairment, Max A)  [] CM                (80-99% Impairment, Dep.)   [] CN                (100% Impairment, Tot Dep.)  [] CH                (0% Impaired, Indep.)  [] CI                  (1-19% Impaired, SBA-CGA)  [] CJ                 (20-39% Impaired, MIN A)  [] CK                (40-59% Impairment, Mod A)  [] CL                 (60-79% Impairment, Max A)  [] CM                (80-99% Impairment, Dep.)   [] CN                (100% Impairment, Tot Dep. )             Subjective: Pt  Stated that her pain was 1/10 today. Pt stated that she is hypersensitive to touch. Pt stated that she is working a lot and not sleeping well. Pt voiced frustration with progress and inability to do what she wants to be able to to do. Any changes in Ambulatory Summary Sheet? no     Objective:    Pt was able to perform SLR with less extension lag today     AROM knee flexion to 123 degrees        Exercises:  Exercise/Equipment 10/17/17 10/20/2017 10/23/17 10/31/17   Nu-step L 1 x 5'  L1 x5' L1 x5'  L-2 x 5'    TM     Retro 2% . 6mph x 3'    Calf raises      Floor 10x2     Quad set    W/ SLR and SAQ    Step ups     6\" x 15 ant  6\" x 15 lat                 Weight shifting          Heelslide w/ sheet    20x AAROM       SLR:   Flex  ABD  ext    10x2 ea dir w/ MA PRN to keep extension 10x2 ea dir w/ MA PRN to keep extension     Sitting at edge of bed PROM to 110 deg          AAROM LAQ           Sitting marches 3# 15x2 3# 15x 2 3# 15x2  3# 10x2     iso hamstring in sitting at 45 deg knee flexion           SAQ 10x2 to end range  10 x 2 to end range 10 x 2 to end range 10x2 to end range     Fitter knee ext          Bridge w/ RSB           LAQ To 40 deg ext 15x2   3#     To 40 deg ext   3#  15x2 To 40 deg ext   3#  10x2     Resisted hamstring curls          Sit to stand from elevated table         Shuttle Walking            GT training          Shuttle Leg press 2c 15x2 2c 15x2 2c 15x2  2C 15x2   FT bwd walking  2 plts 4x 2 plts 4x  2 plts 5x  2 plts 7x   Marches on Airex 30x2 30 x2 30\"x2 30\"x 2   DF calf stretch  FR 20\"x4 FR 20\"x4 1/2 roll 30\"x2  1/2 roll 30\"x2               Other Therapeutic Activities/Education: Continue walking as tolerated. Education given regarding increased pain and use of brace if pain elevates significantly.  Pt reporting pain / swelling with education regarding icing and elevating.      Home Exercise Program:  Continue current program. Pt agreed to comply.       Modality/intervention used:    [x] Therapeutic Exercise                                 [] Modalities:  [] Therapeutic Activity                                                         [] Ultrasound                                      [] Elec  Stim  [] Gait Training                                                                              [] Cervical Traction                  [] Lumbar Traction  [] Neuromuscular Re-education                                            [] Cold/hotpack            [] Iontophoresis             [x] Instruction in HEP                                                                                  [x] Vasopneumatic     [] Manual Therapy [] Aquatic Therapy                        Manual Treatments --     Modalities:  Gameready to L knee in supine  36 deg, med pressure for pain management x 10'      Communication with other providers:  POC sent 8/31/17     Education provided to patient/caregiver:  Ice as needed.      Adverse reactions to treatment:  --     Equipment provided:  --     Assessment:  Pt tolerated treatment fairly well. Pt was able to increase activity in gym today. Pt rated pain at 0/10 after vaso. Time In / Time Out:  1134/1132     Timed Code/Total Treatment Minutes: 48'/58' 1 neuro ( 13') 2 TE (35') 1 vaso ( 10')      Patients Report of Tolerance:    [] Patient limited by fatigue        [x] Patient limited by min pain                [] Patient limited by other medical complications   [] Other:      Prognosis:               [x] Good            [] Fair                                     [] Poor     Plan:                [x] Continue per plan of care                [] Alter current plan (see comments)  [] Plan of care initiated            [] Hold pending MD visit                      [] Discharge     Plan for Next Session:  follow protocol. AROM knee flex/ext as able ->closed chain, Target hips iso->AROM, DF flexibility. Nu-step.  Gameready.           Next Progress Note due:  Radha 8/31/17    Visit 10              Electronically signed by: Dayan Montgomery PTA 10:23 AM  , 10/31/17     10/31/2017,8:16 PM

## 2017-11-01 ENCOUNTER — HOSPITAL ENCOUNTER (OUTPATIENT)
Dept: OTHER | Age: 37
Discharge: HOME | End: 2017-11-30
Attending: ORTHOPAEDIC SURGERY | Admitting: ORTHOPAEDIC SURGERY

## 2017-11-07 ENCOUNTER — OFFICE VISIT (OUTPATIENT)
Dept: ORTHOPEDIC SURGERY | Age: 37
End: 2017-11-07

## 2017-11-07 VITALS — RESPIRATION RATE: 14 BRPM | WEIGHT: 146 LBS | BODY MASS INDEX: 27.56 KG/M2 | HEIGHT: 61 IN

## 2017-11-07 DIAGNOSIS — Z09 POSTOP CHECK: ICD-10-CM

## 2017-11-07 DIAGNOSIS — M25.362 PATELLAR INSTABILITY OF LEFT KNEE: Primary | ICD-10-CM

## 2017-11-07 PROCEDURE — 99024 POSTOP FOLLOW-UP VISIT: CPT | Performed by: ORTHOPAEDIC SURGERY

## 2017-11-07 NOTE — PATIENT INSTRUCTIONS
Plan:   Wean off crutches by this weekend  Wean off brace  By next weekend   continue the PT protocol with emphasis on ROM/strengthening  follow up in 4 weeks

## 2017-11-13 ENCOUNTER — HOSPITAL ENCOUNTER (OUTPATIENT)
Dept: PHYSICAL THERAPY | Age: 37
Discharge: HOME | End: 2017-11-13
Admitting: ORTHOPAEDIC SURGERY

## 2017-11-13 NOTE — FLOWSHEET NOTE
Physical Therapy Daily Treatment Note  Date:  10/17/2017     Patient Name:  Mitzi Abreu                                        :  1980                                         MRN: [id number]  Restrictions/Precautions: WBAT w/ brace  Diagnosis:   LEFT KNEE ARTHROSCOPY, CHONDROPLASTY, OPEN PROXIMAL PATELLA      Date of Surgery: 17  Treatment Diagnosis: L knee pain, stiffness. Gait dysfunction     Insurance/Certification information:  [employer]   Referring Physician:  Dr. Angella Jerez   Next Doctor Visit:  --  Plan of care signed (Y/N): YES  Visit# / total visits:   per new POC  Pain level:               2/10 currently    Goals:         Short term goals  Time Frame for Short term goals: 4 weeks 10/1/17  Short term goal 1: Pt will demo >100 deg knee flexion with full knee ext to ease return to stair negotiation. MET  Short term goal 2: Pt will have d/c crutches and ambulating with knee brace. MET  Short term goal 3: Pt reports >50% reduction in pain to ease sleeping. MET  Short term goal 4: Pt will demo good quad activation with min/no pain. Partially MET   Long term goals   Time Frame for Long term goals : 8 weeks 17   Long term goal 1: Pt goal: return to normal gait with no pain Progressing   Long term goal 2: Pt will demo I with HEP/symptom management. Progressing  Long term goal 3: Pt will demo normal gait on even groud and stairs to ease functional mobility. Progressing   Long term goal 4: Pt will demo >4+/5 knee/hip strength to ease squats.    Long term goal 5: Pt will demo full knee AROM to ease transfers         G-Code Selection: (On Eval and every 10th visit or Discharge)     MEASURE     [x] Mobility: Walking and Moving Around     [x] Current ()   [x] Goal ()   [] DC ()  [] Changing/Maintaining Body Position     [] Current (7174)      [] Goal ()   [] DC ()  [] Carrying / Moving / Handling Objects     [] Current ()   [] Goal ()   [] DC ()  [] Self-Care     [] Current ()   [] Goal ()   [] DC ()  [] Other PT/OT primary DX     [] Current ()   [] Goal ()   [] DC ()     SEVERITY     CURRENT  GOAL  DISCHARGE   [] CH                (0% Impaired, Indep.)  [] CI                  (1-19% Impaired, SBA-CGA)  [] CJ                 (20-39% Impaired, MIN A)  [] CK                (40-59% Impairment, Mod A)  [x] CL                 (60-79% Impairment, Max A)  [] CM                (80-99% Impairment, Dep.)   [] CN                (100% Impairment, Tot Dep.) [] CH                (0% Impaired, Indep.)  [] CI                  (1-19% Impaired, SBA-CGA)  [x] CJ                 (20-39% Impaired, MIN A)  [] CK                (40-59% Impairment, Mod A)  [] CL                 (60-79% Impairment, Max A)  [] CM                (80-99% Impairment, Dep.)   [] CN                (100% Impairment, Tot Dep.)  [] CH                (0% Impaired, Indep.)  [] CI                  (1-19% Impaired, SBA-CGA)  [] CJ                 (20-39% Impaired, MIN A)  [] CK                (40-59% Impairment, Mod A)  [] CL                 (60-79% Impairment, Max A)  [] CM                (80-99% Impairment, Dep.)   [] CN                (100% Impairment, Tot Dep. )             Subjective: Pt stated that her pain was 2/10 today. Pt stated that she still has pain going down the steps. Any changes in Ambulatory Summary Sheet? no     Objective:    Able to increase reps on shuttle and time on lateral wobble board        Exercises:  Exercise/Equipment 10/23/17 10/31/17 11/8/17 11/13/17   Nu-step L1 x5'  L-2 x 5'  Rbike 4'  R bike x 4'    TM   Retro 2% . 6mph x 3'      Calf raises   Floor 10x2       Quad set W/ SLR and SAQ      Step ups   6\" x 15 ant  6\" x 15 lat BOSU 10x anterior and lateral each BOSU 15x anterior and lateral each   Weight shifting         Heelslide w/ sheet 20x AAROM         SLR:   Flex  ABD  ext 10x2 ea dir w/ MA PRN to keep extension 10x2 ea dir w/ MA PRN to keep

## 2017-11-20 ENCOUNTER — TELEPHONE (OUTPATIENT)
Dept: ORTHOPEDIC SURGERY | Age: 37
End: 2017-11-20

## 2017-11-28 ENCOUNTER — HOSPITAL ENCOUNTER (OUTPATIENT)
Dept: PHYSICAL THERAPY | Age: 37
Discharge: HOME | End: 2017-11-28
Admitting: ORTHOPAEDIC SURGERY

## 2017-11-28 NOTE — PROGRESS NOTES
Outpatient Physical Therapy           [place]           [] Phone: [phone]   Fax: [phone]  [place]           [] Phone: [phone]   Fax: [phone]      To:  Dr. Bruno Hernandez    From: Beverley Norris PT     Patient: Margoth Nichols                : 1980  Diagnosis: LEFT KNEE ARTHROSCOPY, CHONDROPLASTY, OPEN PROXIMAL PATELLA    Date: 2017  Treatment Diagnosis:  L knee pain, stiffness. Gait dysfunction       [x]  Progress Note                []  Discharge Note    Evaluation Date:   17  Total Visits to date:  21  Cancels/No-shows to date:  3    Subjective:  Pt stated that her pain was 1/10 today. Completing full work days with up/down in discomfort and swelling. Did have incident with fall and full flexion of the knee with 10/10 pain lasting couple of hours with soreness for days. States feeling well prior to incident. Feels give out sensations every \"once in a while. \" Feels she is % improved. Wanting to continue therapy      Plan of Care/Treatment to date:  [x] Therapeutic Exercise    [x] Modalities:  [] Therapeutic Activity     [] Ultrasound  [] Electrical Stimulation  [] Gait Training      [] Cervical Traction   [] Lumbar Traction  [] Neuromuscular Re-education  [x] Cold/hotpack [] Iontophoresis  [x] Instruction in HEP      Other:  [x] Manual Therapy       [x]  Vasopneumatic  [] Aquatic Therapy       []                    ? Objective/Significant Findings At Last Visit/Comments:       Able to complete toe/heel walking   Completed 1/2 squat with anterior knee pain  L SLS: 20 SLS  Quads   R: 48#      L: 31#    45 deg knee flexion per dynamometer       Hamstring : 30# B  0-130 deg knee ext/flex  Good (-) Quad set with reduced   5/5 L hip strength     4+/5 L knee ext w/ min pain   LEFS this date:    75% full function       Assessment:  Pt has completed 20 visits since start of therapy on 17.   Pt has increased ease completing all functional mobility with min/mod Impaired, Indep.)  [] CI (1-19% Impaired, SBA-CGA)  [x] CJ (20-39% Impaired, MIN A)  [] CK  (40-59% Impairment, Mod A)  [] CL  (60-79% Impairment, Max A)  [] CM  (80-99% Impairment, Dep.)   [] CN  (100% Impairment, Tot Dep.) [] CH (0% Impaired, Indep.)  [] CI (1-19% Impaired, SBA-CGA)  [x] CJ (20-39% Impaired, MIN A)  [] CK  (40-59% Impairment, Mod A)  [] CL  (60-79% Impairment, Max A)  [] CM  (80-99% Impairment, Dep.)   [] CN  (100% Impairment, Tot Dep.)  [] CH (0% Impaired, Indep.)  [] CI (1-19% Impaired, SBA-CGA)  [] CJ (20-39% Impaired, MIN A)  [] CK  (40-59% Impairment, Mod A)  [] CL  (60-79% Impairment, Max A)  [] CM  (80-99% Impairment, Dep.)   [] CN  (100% Impairment, Tot Dep.)             Rehab Potential: [] Excellent [x] Good [] Fair  [] Poor         Patient Status: [x] Continue per initial plan of Care     [] Patient now discharged     [] Additional visits requested, Please re-certify for additional visits:      Requested frequency/duration:     If we are requesting more visits, we fully anticipate the patient's condition is expected to improve within the treatment timeframe we are requesting. Electronically signed by:  Anabel Moreno PT, 11/28/2017, 7:23 AM    11/28/2017 7:23 AM   If you have any questions or concerns, please don't hesitate to call.   Thank you for your referral.    Physician Signature:______________________ Date:______ Time: ________  By signing above, therapists plan is approved by physician

## 2017-11-28 NOTE — FLOWSHEET NOTE
Physical Therapy Daily Treatment Note  Date:  10/17/2017     Patient Name:  Eli Gaston                                        :  1980                                         MRN: [id number]  Restrictions/Precautions: WBAT w/ brace  Diagnosis:   LEFT KNEE ARTHROSCOPY, CHONDROPLASTY, OPEN PROXIMAL PATELLA      Date of Surgery: 17  Treatment Diagnosis: L knee pain, stiffness. Gait dysfunction     Insurance/Certification information:  [employer]   Referring Physician:  Dr. Frankey Iles   Next Doctor Visit:  --  Plan of care signed (Y/N): YES  Visit# / total visits:   per new POC  Pain level:             1  /10 currently    Goals:         Short term goals  Time Frame for Short term goals: 4 weeks 10/1/17  Short term goal 1: Pt will demo >100 deg knee flexion with full knee ext to ease return to stair negotiation. MET  Short term goal 2: Pt will have d/c crutches and ambulating with knee brace. MET  Short term goal 3: Pt reports >50% reduction in pain to ease sleeping. MET  Short term goal 4: Pt will demo good quad activation with min/no pain. Mostly MET   Long term goals   Time Frame for Long term goals : 8 weeks 17   Long term goal 1: Pt goal: return to normal gait with no pain. Mostly MET  Long term goal 2: Pt will demo I with HEP/symptom management. Progressing  Long term goal 3: Pt will demo normal gait on even groud and stairs to ease functional mobility. MET   Long term goal 4: Pt will demo >4+/5 knee/hip strength to ease squats.  Mostly MET  Long term goal 5: Pt will demo full knee AROM to ease transfers MET         G-Code Selection: (On Eval and every 10th visit or Discharge)     MEASURE     [x] Mobility: Walking and Moving Around     [x] Current ()   [x] Goal ()   [] DC ()  [] Changing/Maintaining Body Position     [] Current (8833)      [] Goal ()   [] DC ()  [] Carrying / Moving / Handling Objects     [] Current ()   [] Goal ()   [] DC ()  [] Self-Care     [] Current ()   [] Goal ()   [] DC ()  [] Other PT/OT primary DX     [] Current ()   [] Goal ()   [] DC ()     SEVERITY     CURRENT  GOAL  DISCHARGE   [] CH                (0% Impaired, Indep.)  [] CI                  (1-19% Impaired, SBA-CGA)  [x] CJ                 (20-39% Impaired, MIN A)  [] CK                (40-59% Impairment, Mod A)  [] CL                 (60-79% Impairment, Max A)  [] CM                (80-99% Impairment, Dep.)   [] CN                (100% Impairment, Tot Dep.) [] CH                (0% Impaired, Indep.)  [] CI                  (1-19% Impaired, SBA-CGA)  [x] CJ                 (20-39% Impaired, MIN A)  [] CK                (40-59% Impairment, Mod A)  [] CL                 (60-79% Impairment, Max A)  [] CM                (80-99% Impairment, Dep.)   [] CN                (100% Impairment, Tot Dep.)  [] CH                (0% Impaired, Indep.)  [] CI                  (1-19% Impaired, SBA-CGA)  [] CJ                 (20-39% Impaired, MIN A)  [] CK                (40-59% Impairment, Mod A)  [] CL                 (60-79% Impairment, Max A)  [] CM                (80-99% Impairment, Dep.)   [] CN                (100% Impairment, Tot Dep. )             Subjective: Pt stated that her pain was 1/10 today. Completing full work days with up/down in discomfort and swelling. Did have incident with fall and full flexion of the knee with 10/10 pain lasting couple of hours with soreness for days. States feeling well prior to incident. Feels give out sensations every \"once in a while. \" Feels she is % improved. Wanting to continue therapy.      Any changes in Ambulatory Summary Sheet? no     Objective:      Able to complete toe/heel walking   Completed 1/2 squat with anterior knee pain  L SLS: 20 SLS  Quads   R: 48#      L: 31#    45 deg knee flexion per dynamometer       Hamstring : 30# B  0-130 deg knee ext/flex  Good (-) Quad set with reduced   5/5 L hip strength 4+/5 L knee ext w/ min pain   LEFS this date: 60/80   75% full function        Exercises:  Exercise/Equipment 11/13/17 11/28/17     Nu-step R bike x 4'  4'      TM        Calf raises          Quad set  10x2   3\"     Step ups  BOSU 15x anterior and lateral each      Weight shifting         Heelslide w/ sheet         SLR:   Flex  ABD  ext         Sitting at edge of bed PROM to 110 deg         AAROM LAQ   AROM 20x2       Sitting marches         iso hamstring in sitting at 45 deg knee flexion          SAQ         Fitter knee ext         Bridge w/ RSB          LAQ         Resisted hamstring curls       Wobbleboard Lateral 30\"x2        Sit to stand from elevated table        Shuttle Walking           GT training       SL Bridge  10x2 L       Shuttle Leg press 4C DL 15x  1C SL 10x2      FT bwd walking  4 plts x 4        Mini Squats   10x2     Marches on Airex BOSU 1'x2      DF calf stretch  1/2 roll 30\"x2       Stool Drags 15x2         Other Therapeutic Activities/Education: Continue walking as tolerated. Education given regarding increased pain and use of brace if pain elevates significantly.  Pt reporting pain / swelling with education regarding icing and elevating.      Home Exercise Program:  Continue current program. Pt agreed to comply.       Modality/intervention used:    [x] Therapeutic Exercise                                 [] Modalities:  [] Therapeutic Activity                                                         [] Ultrasound                                      [] Elec  Stim  [] Gait Training                                                                              [] Cervical Traction                  [] Lumbar Traction  [] Neuromuscular Re-education                                            [] Cold/hotpack            [] Iontophoresis             [x] Instruction in HEP                                                                                  [] Vasopneumatic     [] Manual Therapy [] Aquatic Therapy                        Manual Treatments --     Modalities:       Communication with other providers:  POC sent 8/31/17     Education provided to patient/caregiver:  Ice as needed.      Adverse reactions to treatment:  --     Equipment provided:  --     Assessment:  Pt has completed 20 visits since start of therapy on 8/31/17. Pt has increased ease completing all functional mobility with min/mod difficulties completing squats. Pt with recent fall with overpressure into knee flexion with 10/10 pain that is improving. Overall much improved with L quad strength lag compared to contralateral knee per dynamometer that likely contributes to intermittent giving out sensations. Pt will benefit with continued PT services to maximize L quad activation and strength with progression in HEP prior to D/c. Time In / Time Out:  1120/1152     Timed Code/Total Treatment Minutes: 32/32'    2 TE (28')      Patients Report of Tolerance:    [] Patient limited by fatigue        [x] Patient limited by min pain                [] Patient limited by other medical complications   [] Other:      Prognosis:               [x] Good            [] Fair                                     [] Poor     Plan:                [x] Continue per plan of care                [] Alter current plan (see comments)  [] Plan of care initiated            [] Hold pending MD visit                      [] Discharge     Plan for Next Session:  closed chain quad strengthening, R Bike.  Gameready.           Next Progress Note due:  Radha 8/31/17     PN completed 11/28/17           Electronically signed by: Zohreh Montez PT 7:21 AM  , 11/28/17 11/28/2017 7:22 AM

## 2017-12-01 ENCOUNTER — HOSPITAL ENCOUNTER (OUTPATIENT)
Dept: OTHER | Age: 37
Discharge: HOME | End: 2017-12-31
Attending: ORTHOPAEDIC SURGERY | Admitting: ORTHOPAEDIC SURGERY

## 2017-12-06 ENCOUNTER — HOSPITAL ENCOUNTER (OUTPATIENT)
Dept: PHYSICAL THERAPY | Age: 37
Discharge: HOME | End: 2017-12-06
Admitting: ORTHOPAEDIC SURGERY

## 2017-12-06 NOTE — FLOWSHEET NOTE
Physical Therapy Daily Treatment Note  Date:  10/17/2017     Patient Name:  Flex Rosales                                        :  1980                                         MRN: [id number]  Restrictions/Precautions: WBAT w/ brace  Diagnosis:   LEFT KNEE ARTHROSCOPY, CHONDROPLASTY, OPEN PROXIMAL PATELLA      Date of Surgery: 17  Treatment Diagnosis: L knee pain, stiffness. Gait dysfunction     Insurance/Certification information:  [place]   Referring Physician:  Dr. Serafin David   Next Doctor Visit:  --  Plan of care signed (Y/N): YES  Visit# / total visits:   per new POC  Pain level:              0 /10 currently    Goals:         Short term goals  Time Frame for Short term goals: 4 weeks 10/1/17  Short term goal 1: Pt will demo >100 deg knee flexion with full knee ext to ease return to stair negotiation. MET  Short term goal 2: Pt will have d/c crutches and ambulating with knee brace. MET  Short term goal 3: Pt reports >50% reduction in pain to ease sleeping. MET  Short term goal 4: Pt will demo good quad activation with min/no pain. Mostly MET   Long term goals   Time Frame for Long term goals : 8 weeks 17   Long term goal 1: Pt goal: return to normal gait with no pain. Mostly MET  Long term goal 2: Pt will demo I with HEP/symptom management. Progressing  Long term goal 3: Pt will demo normal gait on even groud and stairs to ease functional mobility. MET   Long term goal 4: Pt will demo >4+/5 knee/hip strength to ease squats.  Mostly MET  Long term goal 5: Pt will demo full knee AROM to ease transfers MET         G-Code Selection: (On Eval and every 10th visit or Discharge)     MEASURE     [x] Mobility: Walking and Moving Around     [x] Current ()   [x] Goal ()   [] DC ()  [] Changing/Maintaining Body Position     [] Current (8935)      [] Goal ()   [] DC ()  [] Carrying / Moving / Handling Objects     [] Current ()   [] Goal ()   [] DC ()  [] Self-Care     [] Current ()   [] Goal ()   [] DC ()  [] Other PT/OT primary DX     [] Current ()   [] Goal ()   [] DC ()     SEVERITY     CURRENT  GOAL  DISCHARGE   [] CH                (0% Impaired, Indep.)  [] CI                  (1-19% Impaired, SBA-CGA)  [x] CJ                 (20-39% Impaired, MIN A)  [] CK                (40-59% Impairment, Mod A)  [] CL                 (60-79% Impairment, Max A)  [] CM                (80-99% Impairment, Dep.)   [] CN                (100% Impairment, Tot Dep.) [] CH                (0% Impaired, Indep.)  [] CI                  (1-19% Impaired, SBA-CGA)  [x] CJ                 (20-39% Impaired, MIN A)  [] CK                (40-59% Impairment, Mod A)  [] CL                 (60-79% Impairment, Max A)  [] CM                (80-99% Impairment, Dep.)   [] CN                (100% Impairment, Tot Dep.)  [] CH                (0% Impaired, Indep.)  [] CI                  (1-19% Impaired, SBA-CGA)  [] CJ                 (20-39% Impaired, MIN A)  [] CK                (40-59% Impairment, Mod A)  [] CL                 (60-79% Impairment, Max A)  [] CM                (80-99% Impairment, Dep.)   [] CN                (100% Impairment, Tot Dep. )             Subjective: Pt stated that her pain was 0/10 today. Completing full work days without difficulty. Having spasms in the knee with descending stairs. Any changes in Ambulatory Summary Sheet? no     Objective:     Pain with descending steps with decreased eccentric control. Pt prefers step to pattern with descending at home.    Able to complete 1/2 squat with TRX.        Exercises:  Exercise/Equipment 11/13/17 11/28/17 12/6/17    Nu-step R bike x 4'  4'  R bike x 4'     TM        Calf raises          Quad set  10x2   3\"     Step ups  BOSU 15x anterior and lateral each      Weight shifting         Heelslide w/ sheet         SLR:   Flex  ABD  ext         Sitting at edge of bed PROM to 110 deg         AAROM LAQ AROM 20x2       Sitting marches         iso hamstring in sitting at 45 deg knee flexion          SAQ         Fitter knee ext         Bridge w/ RSB          LAQ         Resisted hamstring curls       Wobbleboard Lateral 30\"x2        Sit to stand from elevated table        Shuttle Walking           GT training       SL Bridge  10x2 L       Shuttle Leg press 4C DL 15x  1C SL 10x2  2C SL 10x2    FT bwd walking  4 plts x 4        BOSU Marches    30\"x2    TRX Pulls   10x2    Mini Squats   10x2     Eccentric step down    2# 10x2  4in step     Marches on Airex BOSU 1'x2      DF calf stretch  1/2 roll 30\"x2       Stool Drags 15x2  12x2       Other Therapeutic Activities/Education: Continue walking as tolerated. Education given regarding increased pain and use of brace if pain elevates significantly. Pt reporting pain / swelling with education regarding icing and elevating.      Home Exercise Program:  Continue current program. Added wall slides and mini squats to HEP 12/6/17.  Pt agreed to comply.       Modality/intervention used:    [x] Therapeutic Exercise                                 [x] Modalities:  [] Therapeutic Activity                                                         [] Ultrasound                                      [] Elec  Stim  [] Gait Training                                                                              [] Cervical Traction                  [] Lumbar Traction  [] Neuromuscular Re-education                                            [] Cold/hotpack            [] Iontophoresis             [x] Instruction in HEP                                                                                  [x] Vasopneumatic     [] Manual Therapy                                                                                              [] Aquatic Therapy                        Manual Treatments --     Modalities:  Gameready to L knee in supine  36 deg, med pressure for pain management x 10'

## 2017-12-12 ENCOUNTER — HOSPITAL ENCOUNTER (OUTPATIENT)
Dept: PHYSICAL THERAPY | Age: 37
Discharge: HOME | End: 2017-12-12
Admitting: ORTHOPAEDIC SURGERY

## 2017-12-12 ENCOUNTER — OFFICE VISIT (OUTPATIENT)
Dept: ORTHOPEDIC SURGERY | Age: 37
End: 2017-12-12

## 2017-12-12 VITALS — WEIGHT: 146 LBS | HEIGHT: 61 IN | RESPIRATION RATE: 14 BRPM | BODY MASS INDEX: 27.56 KG/M2

## 2017-12-12 DIAGNOSIS — Z09 POSTOP CHECK: Primary | ICD-10-CM

## 2017-12-12 PROCEDURE — 99212 OFFICE O/P EST SF 10 MIN: CPT | Performed by: PHYSICIAN ASSISTANT

## 2017-12-12 NOTE — FLOWSHEET NOTE
Physical Therapy Daily Treatment Note  Date:  10/17/2017     Patient Name:  Senait Russell                                        :  1980                                         MRN: [id number]  Restrictions/Precautions: WBAT w/ brace  Diagnosis:   LEFT KNEE ARTHROSCOPY, CHONDROPLASTY, OPEN PROXIMAL PATELLA      Date of Surgery: 17  Treatment Diagnosis: L knee pain, stiffness. Gait dysfunction     Insurance/Certification information:  [place]   Referring Physician:  Dr. Bassem Chew   Next Doctor Visit:  --  Plan of care signed (Y/N): YES  Visit# / total visits:   per new POC  Pain level:              1 /10 currently    Goals:         Short term goals  Time Frame for Short term goals: 4 weeks 10/1/17  Short term goal 1: Pt will demo >100 deg knee flexion with full knee ext to ease return to stair negotiation. MET  Short term goal 2: Pt will have d/c crutches and ambulating with knee brace. MET  Short term goal 3: Pt reports >50% reduction in pain to ease sleeping. MET  Short term goal 4: Pt will demo good quad activation with min/no pain. Mostly MET   Long term goals   Time Frame for Long term goals : 8 weeks 17   Long term goal 1: Pt goal: return to normal gait with no pain. Mostly MET  Long term goal 2: Pt will demo I with HEP/symptom management. Progressing  Long term goal 3: Pt will demo normal gait on even groud and stairs to ease functional mobility. MET   Long term goal 4: Pt will demo >4+/5 knee/hip strength to ease squats.  Mostly MET  Long term goal 5: Pt will demo full knee AROM to ease transfers MET         G-Code Selection: (On Eval and every 10th visit or Discharge)     MEASURE     [x] Mobility: Walking and Moving Around     [x] Current ()   [x] Goal ()   [] DC ()  [] Changing/Maintaining Body Position     [] Current (0879)      [] Goal ()   [] DC ()  [] Carrying / Moving / Handling Objects     [] Current ()   [] Goal ()   [] DC ()  [] Self-Care     [] Current ()   [] Goal ()   [] DC ()  [] Other PT/OT primary DX     [] Current ()   [] Goal ()   [] DC ()     SEVERITY     CURRENT  GOAL  DISCHARGE   [] CH                (0% Impaired, Indep.)  [] CI                  (1-19% Impaired, SBA-CGA)  [x] CJ                 (20-39% Impaired, MIN A)  [] CK                (40-59% Impairment, Mod A)  [] CL                 (60-79% Impairment, Max A)  [] CM                (80-99% Impairment, Dep.)   [] CN                (100% Impairment, Tot Dep.) [] CH                (0% Impaired, Indep.)  [] CI                  (1-19% Impaired, SBA-CGA)  [x] CJ                 (20-39% Impaired, MIN A)  [] CK                (40-59% Impairment, Mod A)  [] CL                 (60-79% Impairment, Max A)  [] CM                (80-99% Impairment, Dep.)   [] CN                (100% Impairment, Tot Dep.)  [] CH                (0% Impaired, Indep.)  [] CI                  (1-19% Impaired, SBA-CGA)  [] CJ                 (20-39% Impaired, MIN A)  [] CK                (40-59% Impairment, Mod A)  [] CL                 (60-79% Impairment, Max A)  [] CM                (80-99% Impairment, Dep.)   [] CN                (100% Impairment, Tot Dep. )             Subjective: Pt stated that her pain was 1/10 today. Completing full work days without difficulty, intermittent pains with full knee flex/ext. Having spasms in the knee with stairs the other day. Any changes in Ambulatory Summary Sheet? no     Objective:     Pain with descending steps with decreased eccentric control. Pt prefers step to pattern with descending at home. Knee effusion at anterior knee.    Observed decreased stability with eccentric lowering.        Exercises:  Exercise/Equipment 11/13/17 11/28/17 12/6/17 12/12/17   Nu-step R bike x 4'  4'  R bike x 4'  R bike x 4'    TM        Calf raises          Quad set  10x2   3\"     Step ups  BOSU 15x anterior and lateral each      Weight shifting [] Aquatic Therapy                        Manual Treatments --     Modalities:   Declined     Communication with other providers:  POC sent 8/31/17     Education provided to patient/caregiver:  Ice as needed.      Adverse reactions to treatment:  --     Equipment provided:  --     Assessment:  Pt tolerated exercises fair with difficulty with eccentric control. Pt lacks strength and increase in pain with eccentric activities. Educated importance to continue ice to improve pain and tightness sensation. Pt reported understanding. Overall improving but will required continued strengthening to return to PLOF and ease completion of stairs. Pt will benefit with continued PT services to maximize L quad strength with progression in HEP prior to D/c. Will continue 1x per week to address deficits. Pt reported 0/10 post tx \"tightness\". Time In / Time Out:  1340/1425     Timed Code/Total Treatment Minutes:  45/45'       3 TE (39')       Patients Report of Tolerance:    [] Patient limited by fatigue        [x] Patient limited by min pain                [] Patient limited by other medical complications   [] Other:      Prognosis:               [x] Good            [] Fair                                     [] Poor     Plan:                [x] Continue per plan of care                [] Alter current plan (see comments)  [] Plan of care initiated            [] Hold pending MD visit                      [] Discharge     Plan for Next Session:  closed chain quad strengthening, R Bike.  Gameready.           Next Progress Note due:  Radha 8/31/17     PN completed 11/28/17           Electronically signed by: Renetta Alaniz PT 8:15 AM  , 12/12/17 12/12/2017 8:15 AM

## 2017-12-12 NOTE — PROGRESS NOTES
Scribe Authentication Statement  [facility]hel[facility] Ahmadi [facility]ITAL[facility]enn[facility], scribed portions of this documentation for and in the presence of Jennifer Hamlin PA-C on 12/12/17 at 1:02 PM.      Mitzi Abreu  [id number]  December 12, 2017    Chief Complaint   Patient presents with    Post-Op Check     left knee     Date of surgery:   August 30th, 2017    HPI: Yamini Molina returns for evaluation following her recent course of PT. She is s/p left knee arthroscopy, patellar chondroplasty, and open proximal patellar realignment. She states she is not yet finished with PT. She continues to show gradual improvement. She does admit to continued discomfort in the anterior knee. Review of History:  Ms. Mitzi Abreu is here in follow up regarding her left knee:  Left knee arthroscopic patellar chondroplasty,  open proximal patellar realignment.     She is doing ok, is still having 2-3/10 pain but overall better. She has  discontinued the assistive devices for ambulation. She is out of the brace. She denies chest pain, SOB, calf pain. No other issues. The patient is still in therapy    Physical: She demonstrates appropriate mood and affect. The left leg exam:  The incisions are clean, dry, intact and nontender and well  healed. She has mild swelling. There are No cords or calf tenderness. No significant calf/ankle edema. She is neurovascularly intact distally.   Knee ROM is 0-140, patella stable to lateral glide with 20% glide at 30 degrees of flexion, mild quad atrophy    Impression: Status post above, mild weakness/quad inhibition     Plan:   Continue physical therapy with emphasis on strengthening and a transition to [facility]  Follow-up as-needed for any persistent pain or instability    Perfecto Bryant PA-C

## 2017-12-27 ENCOUNTER — HOSPITAL ENCOUNTER (OUTPATIENT)
Dept: PHYSICAL THERAPY | Age: 37
Discharge: HOME | End: 2017-12-27
Admitting: ORTHOPAEDIC SURGERY

## 2017-12-27 NOTE — FLOWSHEET NOTE
Self-Care     [] Current ()   [] Goal ()   [] DC ()  [] Other PT/OT primary DX     [] Current ()   [] Goal ()   [] DC ()     SEVERITY     CURRENT  GOAL  DISCHARGE   [] CH                (0% Impaired, Indep.)  [] CI                  (1-19% Impaired, SBA-CGA)  [x] CJ                 (20-39% Impaired, MIN A)  [] CK                (40-59% Impairment, Mod A)  [] CL                 (60-79% Impairment, Max A)  [] CM                (80-99% Impairment, Dep.)   [] CN                (100% Impairment, Tot Dep.) [] CH                (0% Impaired, Indep.)  [] CI                  (1-19% Impaired, SBA-CGA)  [x] CJ                 (20-39% Impaired, MIN A)  [] CK                (40-59% Impairment, Mod A)  [] CL                 (60-79% Impairment, Max A)  [] CM                (80-99% Impairment, Dep.)   [] CN                (100% Impairment, Tot Dep.)  [] CH                (0% Impaired, Indep.)  [] CI                  (1-19% Impaired, SBA-CGA)  [] CJ                 (20-39% Impaired, MIN A)  [] CK                (40-59% Impairment, Mod A)  [] CL                 (60-79% Impairment, Max A)  [] CM                (80-99% Impairment, Dep.)   [] CN                (100% Impairment, Tot Dep. )             Subjective: Pt stated that her pain was 0/10 today. Having spasms in the knee every other day. But notes completing a lot of activity celebrating the holidays. Any changes in Ambulatory Summary Sheet? no     Objective:    Unable to complete elliptical >30 sec secondary to knee pain.  Difficulty with full weightbearing onto LE with resisted hip exercises.     Exercises:  Exercise/Equipment 12/6/17 12/12/17 12/27/17   Nu-step R bike x 4'  R bike x 4'  R bike 4'    TM       Calf raises         Quad set      Step ups       Weight shifting        Heelslide w/ sheet        SLR:   Flex  ABD  ext        AAROM LAQ         Sitting marches        iso hamstring in sitting at 45 deg knee flexion         SAQ        Fitter lateral    1 blk/blue band 20x2     Bridge w/ RSB         Resisted hamstring curls  BTB 15x2     Wobbleboard  Lateral 30\"x2      Sit to stand from elevated table       Shuttle Walking          GT training      SL Bridge        Shuttle Leg press 2C SL 10x2 4C DL 15x  1C SL 10x2    FT bwd walking    Lateral 2 plts 3x each dir    Med ball pass into rebounder on BOSU   15x2 4# med ball    BOSU Marches  30\"x2 30\"x2 Airex 30\"x2   Lateral step ups on BOSU  10x2     TRX Pulls 10x2  10x2   Mini Squats       Eccentric step down  2# 10x2  4in step  10x2  4in step down anterior     Sled    Bwd 50ft 25# x2   Stool Drags   15x2   Resisted ext/ABD   BTB 10x2  B   TKE  BTB 10x2 3\"    Marches on Airex      Beam balance   Tandem stance walking and 4# med ball pass, EO/EC  5'   DF calf stretch        Stool Drags 12x2  12x2 fwd      Other Therapeutic Activities/Education: --     Home Exercise Program:  Continue current program. Added wall slides and mini squats to HEP 12/6/17.  Pt agreed to comply.       Modality/intervention used:    [x] Therapeutic Exercise                                 [x] Modalities:  [] Therapeutic Activity                                                         [] Ultrasound                                      [] Elec  Stim  [] Gait Training                                                                              [] Cervical Traction                  [] Lumbar Traction  [] Neuromuscular Re-education                                            [] Cold/hotpack            [] Iontophoresis             [x] Instruction in HEP                                                                                  [x] Vasopneumatic     [] Manual Therapy                                                                                              [] Aquatic Therapy                        Manual Treatments --     Modalities:  Gameready to L knee in supine  36 deg, med pressure for pain management x 10'      Communication with

## 2018-01-01 ENCOUNTER — HOSPITAL ENCOUNTER (OUTPATIENT)
Dept: OTHER | Age: 38
Discharge: HOME | End: 2018-01-31
Attending: ORTHOPAEDIC SURGERY | Admitting: ORTHOPAEDIC SURGERY

## 2018-01-09 ENCOUNTER — HOSPITAL ENCOUNTER (OUTPATIENT)
Dept: PHYSICAL THERAPY | Age: 38
Discharge: HOME | End: 2018-01-09
Admitting: ORTHOPAEDIC SURGERY

## 2018-01-09 NOTE — FLOWSHEET NOTE
Physical Therapy Daily Treatment Note  Date:  10/17/2017     Patient Name:  Steve Tenorio                                        :  1980                                         MRN: [id number]  Restrictions/Precautions: WBAT w/ brace  Diagnosis:   LEFT KNEE ARTHROSCOPY, CHONDROPLASTY, OPEN PROXIMAL PATELLA      Date of Surgery: 17  Treatment Diagnosis: L knee pain, stiffness. Gait dysfunction     Insurance/Certification information:  [employer]   Referring Physician:  Dr. Navjot Dawson   Next Doctor Visit:  --  Plan of care signed (Y/N): YES  Visit# / total visits:   per new POC  Pain level:             1  /10 currently    Goals:         Short term goals  Time Frame for Short term goals: 4 weeks 10/1/17  Short term goal 1: Pt will demo >100 deg knee flexion with full knee ext to ease return to stair negotiation. MET  Short term goal 2: Pt will have d/c crutches and ambulating with knee brace. MET  Short term goal 3: Pt reports >50% reduction in pain to ease sleeping. MET  Short term goal 4: Pt will demo good quad activation with min/no pain. Mostly MET   Long term goals   Time Frame for Long term goals : 8 weeks 17   Long term goal 1: Pt goal: return to normal gait with no pain. Mostly MET  Long term goal 2: Pt will demo I with HEP/symptom management. Progressing  Long term goal 3: Pt will demo normal gait on even groud and stairs to ease functional mobility. MET   Long term goal 4: Pt will demo >4+/5 knee/hip strength to ease squats.  Mostly MET  Long term goal 5: Pt will demo full knee AROM to ease transfers MET         G-Code Selection: (On Eval and every 10th visit or Discharge)     MEASURE     [x] Mobility: Walking and Moving Around     [x] Current ()   [x] Goal ()   [] DC ()  [] Changing/Maintaining Body Position     [] Current (9463)      [] Goal ()   [] DC ()  [] Carrying / Moving / Handling Objects     [] Current ()   [] Goal ()   [] DC ()  [] Self-Care     [] Current ()   [] Goal ()   [] DC ()  [] Other PT/OT primary DX     [] Current ()   [] Goal ()   [] DC ()     SEVERITY     CURRENT  GOAL  DISCHARGE   [] CH                (0% Impaired, Indep.)  [] CI                  (1-19% Impaired, SBA-CGA)  [x] CJ                 (20-39% Impaired, MIN A)  [] CK                (40-59% Impairment, Mod A)  [] CL                 (60-79% Impairment, Max A)  [] CM                (80-99% Impairment, Dep.)   [] CN                (100% Impairment, Tot Dep.) [] CH                (0% Impaired, Indep.)  [] CI                  (1-19% Impaired, SBA-CGA)  [x] CJ                 (20-39% Impaired, MIN A)  [] CK                (40-59% Impairment, Mod A)  [] CL                 (60-79% Impairment, Max A)  [] CM                (80-99% Impairment, Dep.)   [] CN                (100% Impairment, Tot Dep.)  [] CH                (0% Impaired, Indep.)  [] CI                  (1-19% Impaired, SBA-CGA)  [] CJ                 (20-39% Impaired, MIN A)  [] CK                (40-59% Impairment, Mod A)  [] CL                 (60-79% Impairment, Max A)  [] CM                (80-99% Impairment, Dep.)   [] CN                (100% Impairment, Tot Dep. )             Subjective: Pt stated that her pain was 1/10 today. States falling on the ice with knee flexion with soreness in knee post. Denies any pain after last visit. Any changes in Ambulatory Summary Sheet? No     Objective:    Unable to complete full squats with TRX secondary to pain. Demo good technique with SLS and uneven surfaces this date.  Requires CGA with EC on BOSU.        Exercises:  Exercise/Equipment 12/6/17 12/12/17 12/27/17 1/9/18   Nu-step R bike x 4'  R bike x 4'  R bike 4'  R bike 3'   TM        Calf raises          Quad set       Step ups        Weight shifting         Heelslide w/ sheet         SLR:   Flex  ABD  ext         AAROM LAQ          Sitting marches         iso hamstring in sitting at 45

## 2018-02-01 ENCOUNTER — HOSPITAL ENCOUNTER (OUTPATIENT)
Dept: OTHER | Age: 38
Discharge: HOME | End: 2018-02-14
Attending: ORTHOPAEDIC SURGERY | Admitting: ORTHOPAEDIC SURGERY

## 2018-02-13 ENCOUNTER — HOSPITAL ENCOUNTER (OUTPATIENT)
Dept: PHYSICAL THERAPY | Age: 38
Discharge: HOME | End: 2018-02-13
Admitting: ORTHOPAEDIC SURGERY

## 2018-02-13 NOTE — DISCHARGE SUMMARY
contralateral knee per dynamometer , full knee AROM, good SLS and pain at min to none.  Pt has met all goals and demo I with HEP. Pt will be d/c at this time. Pt agrees to this plan. Goal Status:  [x] Achieved [] Partially Achieved  [] Not Achieved   Short term goals  Time Frame for Short term goals: 4 weeks 10/1/17  Short term goal 1: Pt will demo >100 deg knee flexion with full knee ext to ease return to stair negotiation.  MET  Short term goal 2: Pt will have d/c crutches and ambulating with knee brace. MET  Short term goal 3: Pt reports >50% reduction in pain to ease sleeping. MET  Short term goal 4: Pt will demo good quad activation with min/no pain.  MET   Long term goals   Time Frame for Long term goals : 8 weeks 11/1/17   Long term goal 1: Pt goal: return to normal gait with no pain. MET  Long term goal 2: Pt will demo I with HEP/symptom management. MET  Long term goal 3: Pt will demo normal gait on even groud and stairs to ease functional mobility. MET   Long term goal 4: Pt will demo >4+/5 knee/hip strength to ease squats.   MET  Long term goal 5: Pt will demo full knee AROM to ease transfers MET    G-Code Selection: (On Eval and every 10th visit or Discharge)  MEASURE  [x] Mobility: Walking and Moving Around     [] Current ()   [x] Goal ()   [x] DC ()  [] Changing/Maintaining Body Position     [] Current (9714)      [] Goal ()   [] DC ()  [] Carrying / Moving / Handling Objects     [] Current ()   [] Goal ()   [] DC ()  [] Self-Care     [] Current ()   [] Goal ()   [] DC ()  [] Other PT/OT primary DX     [] Current ()   [] Goal ()   [] DC ()    SEVERITY  CURRENT  GOAL  DISCHARGE   [] CH (0% Impaired, Indep.)  [] CI (1-19% Impaired, SBA-CGA)  [] CJ (20-39% Impaired, MIN A)  [] CK  (40-59% Impairment, Mod A)  [] CL  (60-79% Impairment, Max A)  [] CM  (80-99% Impairment, Dep.)   [] CN  (100% Impairment, Tot Dep.) [] CH (0% Impaired,

## 2018-03-12 ENCOUNTER — HOSPITAL ENCOUNTER (OUTPATIENT)
Dept: GENERAL RADIOLOGY | Age: 38
Discharge: HOME | End: 2018-03-12
Attending: INTERNAL MEDICINE | Admitting: INTERNAL MEDICINE

## 2018-03-12 DIAGNOSIS — R13.19 CERVICAL DYSPHAGIA: ICD-10-CM

## 2018-03-12 DIAGNOSIS — R13.19 OTHER DYSPHAGIA: ICD-10-CM

## 2018-04-30 ENCOUNTER — HOSPITAL ENCOUNTER (OUTPATIENT)
Dept: CT IMAGING | Age: 38
Discharge: HOME | End: 2018-04-30
Attending: INTERNAL MEDICINE | Admitting: INTERNAL MEDICINE

## 2018-04-30 DIAGNOSIS — R10.819 ABDOMINAL TENDERNESS, REBOUND TENDERNESS PRESENCE NOT SPECIFIED, UNSPECIFIED LOCATION: ICD-10-CM

## 2019-12-18 ENCOUNTER — HOSPITAL ENCOUNTER (OUTPATIENT)
Dept: MRI IMAGING | Age: 39
Discharge: HOME | End: 2019-12-18
Payer: COMMERCIAL

## 2019-12-18 DIAGNOSIS — M54.16 LUMBAR RADICULOPATHY: ICD-10-CM

## 2019-12-18 PROCEDURE — 72148 MRI LUMBAR SPINE W/O DYE: CPT

## 2020-01-02 ENCOUNTER — HOSPITAL ENCOUNTER (OUTPATIENT)
Dept: MRI IMAGING | Age: 40
Discharge: HOME | End: 2020-01-02
Payer: COMMERCIAL

## 2020-01-02 PROCEDURE — 72141 MRI NECK SPINE W/O DYE: CPT

## 2020-03-12 ENCOUNTER — APPOINTMENT (OUTPATIENT)
Dept: GENERAL RADIOLOGY | Age: 40
End: 2020-03-12
Payer: COMMERCIAL

## 2020-03-12 ENCOUNTER — HOSPITAL ENCOUNTER (EMERGENCY)
Age: 40
Discharge: HOME | End: 2020-03-12
Attending: EMERGENCY MEDICINE
Payer: COMMERCIAL

## 2020-03-12 VITALS
SYSTOLIC BLOOD PRESSURE: 139 MMHG | TEMPERATURE: 99.8 F | HEIGHT: 61 IN | OXYGEN SATURATION: 98 % | WEIGHT: 138 LBS | BODY MASS INDEX: 26.06 KG/M2 | DIASTOLIC BLOOD PRESSURE: 85 MMHG | RESPIRATION RATE: 18 BRPM | HEART RATE: 89 BPM

## 2020-03-12 LAB
ANION GAP SERPL CALCULATED.3IONS-SCNC: 12 MMOL/L (ref 4–16)
BASOPHILS ABSOLUTE: 0 K/CU MM
BASOPHILS RELATIVE PERCENT: 0.5 % (ref 0–1)
BUN BLDV-MCNC: 11 MG/DL (ref 6–23)
CALCIUM SERPL-MCNC: 9.9 MG/DL (ref 8.3–10.6)
CHLORIDE BLD-SCNC: 104 MMOL/L (ref 99–110)
CO2: 25 MMOL/L (ref 21–32)
CREAT SERPL-MCNC: 0.8 MG/DL (ref 0.6–1.1)
DIFFERENTIAL TYPE: ABNORMAL
EOSINOPHILS ABSOLUTE: 0.2 K/CU MM
EOSINOPHILS RELATIVE PERCENT: 2.3 % (ref 0–3)
GFR AFRICAN AMERICAN: >60 ML/MIN/1.73M2
GFR NON-AFRICAN AMERICAN: >60 ML/MIN/1.73M2
GLUCOSE BLD-MCNC: 80 MG/DL (ref 70–99)
HCT VFR BLD CALC: 48.1 % (ref 37–47)
HEMOGLOBIN: 14.9 GM/DL (ref 12.5–16)
IMMATURE NEUTROPHIL %: 0.3 % (ref 0–0.43)
LYMPHOCYTES ABSOLUTE: 1.1 K/CU MM
LYMPHOCYTES RELATIVE PERCENT: 12.1 % (ref 24–44)
MCH RBC QN AUTO: 32 PG (ref 27–31)
MCHC RBC AUTO-ENTMCNC: 31 % (ref 32–36)
MCV RBC AUTO: 103.2 FL (ref 78–100)
MONOCYTES ABSOLUTE: 0.6 K/CU MM
MONOCYTES RELATIVE PERCENT: 6.9 % (ref 0–4)
PDW BLD-RTO: 13 % (ref 11.7–14.9)
PLATELET # BLD: 237 K/CU MM (ref 140–440)
PMV BLD AUTO: 12 FL (ref 7.5–11.1)
POTASSIUM SERPL-SCNC: 4.1 MMOL/L (ref 3.5–5.1)
RAPID INFLUENZA  B AGN: NEGATIVE
RAPID INFLUENZA A AGN: NEGATIVE
RBC # BLD: 4.66 M/CU MM (ref 4.2–5.4)
SEGMENTED NEUTROPHILS ABSOLUTE COUNT: 6.8 K/CU MM
SEGMENTED NEUTROPHILS RELATIVE PERCENT: 77.9 % (ref 36–66)
SODIUM BLD-SCNC: 141 MMOL/L (ref 135–145)
TOTAL IMMATURE NEUTOROPHIL: 0.03 K/CU MM
WBC # BLD: 8.7 K/CU MM (ref 4–10.5)

## 2020-03-12 PROCEDURE — 6360000002 HC RX W HCPCS: Performed by: EMERGENCY MEDICINE

## 2020-03-12 PROCEDURE — 87804 INFLUENZA ASSAY W/OPTIC: CPT

## 2020-03-12 PROCEDURE — 2580000003 HC RX 258: Performed by: EMERGENCY MEDICINE

## 2020-03-12 PROCEDURE — 71046 X-RAY EXAM CHEST 2 VIEWS: CPT

## 2020-03-12 PROCEDURE — 85025 COMPLETE CBC W/AUTO DIFF WBC: CPT

## 2020-03-12 PROCEDURE — 99284 EMERGENCY DEPT VISIT MOD MDM: CPT

## 2020-03-12 PROCEDURE — 96374 THER/PROPH/DIAG INJ IV PUSH: CPT

## 2020-03-12 PROCEDURE — 80048 BASIC METABOLIC PNL TOTAL CA: CPT

## 2020-03-12 RX ORDER — GABAPENTIN 100 MG/1
300 CAPSULE ORAL 3 TIMES DAILY
COMMUNITY
End: 2021-01-04

## 2020-03-12 RX ORDER — KETOROLAC TROMETHAMINE 30 MG/ML
15 INJECTION, SOLUTION INTRAMUSCULAR; INTRAVENOUS ONCE
Status: COMPLETED | OUTPATIENT
Start: 2020-03-12 | End: 2020-03-12

## 2020-03-12 RX ORDER — 0.9 % SODIUM CHLORIDE 0.9 %
1000 INTRAVENOUS SOLUTION INTRAVENOUS ONCE
Status: COMPLETED | OUTPATIENT
Start: 2020-03-12 | End: 2020-03-12

## 2020-03-12 RX ADMIN — KETOROLAC TROMETHAMINE 15 MG: 30 INJECTION, SOLUTION INTRAMUSCULAR; INTRAVENOUS at 14:29

## 2020-03-12 RX ADMIN — SODIUM CHLORIDE 1000 ML: 9 INJECTION, SOLUTION INTRAVENOUS at 14:29

## 2020-03-12 ASSESSMENT — ENCOUNTER SYMPTOMS
NAUSEA: 1
WHEEZING: 0
COUGH: 1
DIARRHEA: 0
SINUS PRESSURE: 0
SORE THROAT: 1
ABDOMINAL PAIN: 0
SHORTNESS OF BREATH: 0
CONSTIPATION: 0
RHINORRHEA: 1
VOMITING: 0

## 2020-03-12 ASSESSMENT — PAIN SCALES - GENERAL
PAINLEVEL_OUTOF10: 1
PAINLEVEL_OUTOF10: 8
PAINLEVEL_OUTOF10: 1
PAINLEVEL_OUTOF10: 8

## 2020-03-12 ASSESSMENT — PAIN DESCRIPTION - DURATION
DURATION_2: CONTINUOUS
DURATION_3: CONTINUOUS

## 2020-03-12 ASSESSMENT — PAIN DESCRIPTION - LOCATION
LOCATION_2: HEAD
LOCATION: GENERALIZED
LOCATION_3: THROAT

## 2020-03-12 ASSESSMENT — PAIN DESCRIPTION - PAIN TYPE
TYPE: ACUTE PAIN
TYPE: ACUTE PAIN

## 2020-03-12 ASSESSMENT — PAIN DESCRIPTION - DESCRIPTORS
DESCRIPTORS_3: BURNING
DESCRIPTORS: ACHING
DESCRIPTORS_2: SHARP;BURNING

## 2020-03-12 ASSESSMENT — PAIN DESCRIPTION - INTENSITY
RATING_3: 4
RATING_2: 9

## 2020-03-12 ASSESSMENT — PAIN DESCRIPTION - FREQUENCY: FREQUENCY: CONTINUOUS

## 2020-03-12 NOTE — ED PROVIDER NOTES
education level: Not on file   Occupational History    Not on file   Social Needs    Financial resource strain: Not on file    Food insecurity     Worry: Not on file     Inability: Not on file    Transportation needs     Medical: Not on file     Non-medical: Not on file   Tobacco Use    Smoking status: Current Every Day Smoker     Packs/day: 1.00     Years: 23.00     Pack years: 23.00     Types: Cigarettes, E-Cigarettes     Start date: 1994    Smokeless tobacco: Never Used   Substance and Sexual Activity    Alcohol use: Yes     Comment: \"Occ. Maybe Once A Month\"    Drug use: No    Sexual activity: Yes     Partners: Male   Lifestyle    Physical activity     Days per week: Not on file     Minutes per session: Not on file    Stress: Not on file   Relationships    Social connections     Talks on phone: Not on file     Gets together: Not on file     Attends Evangelical service: Not on file     Active member of club or organization: Not on file     Attends meetings of clubs or organizations: Not on file     Relationship status: Not on file    Intimate partner violence     Fear of current or ex partner: Not on file     Emotionally abused: Not on file     Physically abused: Not on file     Forced sexual activity: Not on file   Other Topics Concern    Not on file   Social History Narrative    Not on file     No current facility-administered medications for this encounter. Current Outpatient Medications   Medication Sig Dispense Refill    gabapentin (NEURONTIN) 100 MG capsule Take 100 mg by mouth 2 times daily. 1-2 tablets in the morning and 1 -3 tablets at bedtime.  IBUPROFEN PO Take by mouth as needed Over The Counter      albuterol (PROVENTIL HFA) 108 (90 BASE) MCG/ACT inhaler Inhale 2 puffs into the lungs every 4 hours as needed for Wheezing or Shortness of Breath for 30 days. With spacer (and mask if indicated). Thanks.  1 Inhaler 1     Allergies   Allergen Reactions    Lexapro [Escitalopram] \"Shot Up My Blood Pressure And Pulse, Dizziness Heart Palpitations\"    Cymbalta [Duloxetine Hcl] Other (See Comments)     Can't remember    Zoloft [Sertraline Hcl] Nausea And Vomiting       Nursing Notes Reviewed    Physical Exam:  Triage VS:    ED Triage Vitals [03/12/20 1331]   Enc Vitals Group      /85      Pulse 89      Resp 18      Temp 99.8 °F (37.7 °C)      Temp Source Oral      SpO2 98 %      Weight 138 lb (62.6 kg)      Height 5' 1\" (1.549 m)      Head Circumference       Peak Flow       Pain Score       Pain Loc       Pain Edu? Excl. in [address]? Physical Exam  Vitals signs and nursing note reviewed. Constitutional:       Appearance: Normal appearance. HENT:      Head: Normocephalic and atraumatic. Nose: Congestion and rhinorrhea present. Mouth/Throat:      Mouth: Mucous membranes are moist.   Eyes:      Conjunctiva/sclera: Conjunctivae normal.   Cardiovascular:      Rate and Rhythm: Normal rate and regular rhythm. Pulmonary:      Effort: Pulmonary effort is normal. No respiratory distress. Breath sounds: Normal breath sounds. No wheezing, rhonchi or rales. Abdominal:      General: Abdomen is flat. Bowel sounds are normal. There is no distension. Palpations: Abdomen is soft. Tenderness: There is no abdominal tenderness. There is no guarding or rebound. Musculoskeletal: Normal range of motion. Skin:     General: Skin is warm. Capillary Refill: Capillary refill takes less than 2 seconds. Neurological:      Mental Status: She is alert and oriented to person, place, and time. Psychiatric:         Mood and Affect: Mood normal.         Thought Content:  Thought content normal.         Judgment: Judgment normal.        I have reviewed and interpreted all of the currently available lab results from this visit (if applicable):  Results for orders placed or performed during the hospital encounter of 03/12/20   Rapid Flu Swab   Result Value Ref Range    Rapid Influenza A Ag NEGATIVE NEGATIVE    Rapid Influenza B Ag NEGATIVE NEGATIVE   CBC Auto Differential   Result Value Ref Range    WBC 8.7 4.0 - 10.5 K/CU MM    RBC 4.66 4.2 - 5.4 M/CU MM    Hemoglobin 14.9 12.5 - 16.0 GM/DL    Hematocrit 48.1 (H) 37 - 47 %    .2 (H) 78 - 100 FL    MCH 32.0 (H) 27 - 31 PG    MCHC 31.0 (L) 32.0 - 36.0 %    RDW 13.0 11.7 - 14.9 %    Platelets 989 373 - 355 K/CU MM    MPV 12.0 (H) 7.5 - 11.1 FL    Differential Type AUTOMATED DIFFERENTIAL     Segs Relative 77.9 (H) 36 - 66 %    Lymphocytes % 12.1 (L) 24 - 44 %    Monocytes % 6.9 (H) 0 - 4 %    Eosinophils % 2.3 0 - 3 %    Basophils % 0.5 0 - 1 %    Segs Absolute 6.8 K/CU MM    Lymphocytes Absolute 1.1 K/CU MM    Monocytes Absolute 0.6 K/CU MM    Eosinophils Absolute 0.2 K/CU MM    Basophils Absolute 0.0 K/CU MM    Immature Neutrophil % 0.3 0 - 0.43 %    Total Immature Neutrophil 0.03 K/CU MM   Basic Metabolic Panel w/ Reflex to MG   Result Value Ref Range    Sodium 141 135 - 145 MMOL/L    Potassium 4.1 3.5 - 5.1 MMOL/L    Chloride 104 99 - 110 mMol/L    CO2 25 21 - 32 MMOL/L    Anion Gap 12 4 - 16    BUN 11 6 - 23 MG/DL    CREATININE 0.8 0.6 - 1.1 MG/DL    Glucose 80 70 - 99 MG/DL    Calcium 9.9 8.3 - 10.6 MG/DL    GFR Non-African American >60 >60 mL/min/1.73m2    GFR African American >60 >60 mL/min/1.73m2        Radiographs (if obtained):    [] Radiologist's Report Reviewed:  XR CHEST STANDARD (2 VW)   Final Result   No acute process. Chart review shows recent radiographs:  No results found. MDM:  Patient seen and examined. Labs and imaging obtained. Patient influenza negative, chest x-ray no acute disease. Without leukocytosis, electrolytes within normal limits. Patient with improvement of symptoms here in the ED. Case is discussed with health department, and they do not recommend testing at this time or patient to be placed in quarantine.   They recommended that patient be afebrile for 24 hours without antipyretic, once she is afebrile, she may go about her daily activities. This is discussed with patient, she verbalizes understanding of this. Patient is afebrile here in the ED. Patient to follow-up with primary care provider in 1 day, and return to the ED if her friends has become positive for coronavirus, she begins having shortness of breath, chest pain, difficulty breathing, worsening fever, worsening symptoms. She understands verbalizes understanding of this. 3:19 PM   Re-evaluation of patient. States she is feeling better after the IVF. Again asked patient if she has been in contact with anyone that has tested positive for Covid-19. She states that she has not. However she does now state that her friend's  is currently being tested for the virus, and does not know if he tested positive or not. Has been in contact with her friend, has not been in contact with the  who is currently being tested. 3:35 PM   Patient discussed with health department, Michel Sarabia RN who is communicable disease director of nursing. She states that patient is a low risk at this time, does not recommend testing patient for Covid 19. Does recommend that patient to be afebrile for 24 hours without antipyretic in order to go about her daily activities and to be in public spaces. If that if patient symptoms worsen, or her friend's 's situation changes, patient may return to the ED. Otherwise she is to follow-up with primary care provider. 3:59 PM  Discussed plan of care with patient, as well as follow-up, as well as being afebrile, she verbalizes understanding of this. Clinical Impression:  1.  Acute upper respiratory infection      Disposition referral (if applicable):  Jessie Jeffery, [address] [facility] [address] [facility]  [phone]    Schedule an appointment as soon as possible for a visit in 1 day      [facility] Emergency Department  Fabio Anguiano

## 2020-07-03 ENCOUNTER — HOSPITAL ENCOUNTER (OUTPATIENT)
Dept: GENERAL RADIOLOGY | Age: 40
Discharge: HOME | End: 2020-07-03
Payer: COMMERCIAL

## 2020-07-03 ENCOUNTER — HOSPITAL ENCOUNTER (OUTPATIENT)
Age: 40
Discharge: HOME | End: 2020-07-03
Payer: COMMERCIAL

## 2020-07-03 PROCEDURE — 72040 X-RAY EXAM NECK SPINE 2-3 VW: CPT

## 2020-07-27 ENCOUNTER — OFFICE VISIT (OUTPATIENT)
Dept: SURGERY | Age: 40
End: 2020-07-27
Payer: COMMERCIAL

## 2020-07-27 VITALS
SYSTOLIC BLOOD PRESSURE: 122 MMHG | OXYGEN SATURATION: 98 % | WEIGHT: 146.4 LBS | HEART RATE: 72 BPM | DIASTOLIC BLOOD PRESSURE: 78 MMHG | HEIGHT: 61 IN | BODY MASS INDEX: 27.64 KG/M2 | TEMPERATURE: 98.3 F

## 2020-07-27 PROCEDURE — 99204 OFFICE O/P NEW MOD 45 MIN: CPT | Performed by: SURGERY

## 2020-07-27 ASSESSMENT — ENCOUNTER SYMPTOMS
CHOKING: 0
ABDOMINAL PAIN: 1
RECTAL PAIN: 0
COLOR CHANGE: 0
ANAL BLEEDING: 0
BACK PAIN: 0
STRIDOR: 0
PHOTOPHOBIA: 0
APNEA: 0
SORE THROAT: 0
EYE REDNESS: 0
CONSTIPATION: 0
EYE ITCHING: 0

## 2020-07-27 NOTE — PROGRESS NOTES
Chief Complaint   Patient presents with    Other     PP S/P [facility] 5/15/15 C/O possible new hernia LLQ       SUBJECTIVE:  HPI: Patient presents forevaluation of left inguinal hernia. Symptoms were first noted 8 months ago. Pain is intermittent . Lump is not reducible. Pt denies nausea. Pt with previous history of abdominal surgery for incisional hernia. I have reviewed the patient's(pertinent information to this visit) medical history, family history(scanned in  theMedia tab under \"patient questioner\"), social history and review of systems with the patient today in the office.        Past Surgical History:   Procedure Laterality Date    DENTAL SURGERY      Teeth Extracted In Past    DILATION AND CURETTAGE OF UTERUS  6-20-12    Laparoscopy, \"Cyst Right Ovary\"    HERNIA REPAIR  5-76    Umbilical Hernia Repair With Mesh    HERNIA REPAIR  05/15/2015    Robotic Laparoscopic Ventral Hernia Repair With Mesh    HYSTERECTOMY VAGINAL  05/2013    KNEE ARTHROSCOPY Left     chondroplasty open proximal patella    LAPAROSCOPY  6-20-12    D & C, \"Cyst Right Ovary\"    LAPAROSCOPY  11-12    TONSILLECTOMY AND ADENOIDECTOMY  1992    TUBAL LIGATION  2001    WISDOM TOOTH EXTRACTION      4 [place] Teeth Extracted In Past     Past Medical History:   Diagnosis Date    Anesthesia     \"I Wake Up Crying\"    Anxiety     Arthritis     \"Knees\"    Asthma     No Pulmonologist At This Time    Bronchitis Last Episode In 2013    Chronic back pain     Cracked tooth     Upper Left    Depression     Heart murmur     No Cardiologist At This Time    Heartburn     \"Frequent Heartburn\"    History of blood transfusion 1997    No Reaction To Blood Transfusion Received    [place] (hard of hearing)     \"Both Ears\"    Irritable bowel disease     Migraines Last Migraines 8-25-17    Panic attacks     Pneumonia Last Episode In 2013    Shortness of breath on exertion     Teeth missing     Upper And Lower    Urinary, incontinence, stress female     Uses contact lenses     Wears glasses         Family History   Adopted: Yes   Problem Relation Age of Onset    Cancer Father         Melanoma    Other Father         Diverticulitis    Arthritis Brother     Hearing Loss Daughter     Scoliosis Daughter     Other Son         Jane Milling Migraines\"   Alatorre Vision Loss Son         Wears Glasses    Kidney Disease Son         \"Stage One\"     Social History     Socioeconomic History    Marital status:      Spouse name: Not on file    Number of children: Not on file    Years of education: Not on file    Highest education level: Not on file   Occupational History    Not on file   Social Needs    Financial resource strain: Not on file    Food insecurity     Worry: Not on file     Inability: Not on file   [employer] needs     Medical: Not on file     Non-medical: Not on file   Tobacco Use    Smoking status: Current Every Day Smoker     Packs/day: 1.00     Years: 23.00     Pack years: 23.00     Types: Cigarettes, E-Cigarettes     Start date: 1994    Smokeless tobacco: Never Used   Substance and Sexual Activity    Alcohol use: Yes     Comment: \"Occ.  Maybe Once A Month\"    Drug use: No    Sexual activity: Yes     Partners: Male   Lifestyle    Physical activity     Days per week: Not on file     Minutes per session: Not on file    Stress: Not on file   Relationships    Social connections     Talks on phone: Not on file     Gets together: Not on file     Attends Anabaptism service: Not on file     Active member of club or organization: Not on file     Attends meetings of clubs or organizations: Not on file     Relationship status: Not on file    Intimate partner violence     Fear of current or ex partner: Not on file     Emotionally abused: Not on file     Physically abused: Not on file     Forced sexual activity: Not on file   Other Topics Concern    Not on file   Social History Narrative    Not on file       Current Outpatient Medications Medication Sig Dispense Refill    gabapentin (NEURONTIN) 100 MG capsule Take 100 mg by mouth 2 times daily. 1-2 tablets in the morning and 1 -3 tablets at bedtime.  albuterol (PROVENTIL HFA) 108 (90 BASE) MCG/ACT inhaler Inhale 2 puffs into the lungs every 4 hours as needed for Wheezing or Shortness of Breath for 30 days. With spacer (and mask if indicated). Thanks. 1 Inhaler 1     No current facility-administered medications for this visit. Allergies   Allergen Reactions    Lexapro [Escitalopram]      \"Shot Up My Blood Pressure And Pulse, Dizziness Heart Palpitations\"    Cymbalta [Duloxetine Hcl] Other (See Comments)     Can't remember    Zoloft [Sertraline Hcl] Nausea And Vomiting       Review of Systems:         Review of Systems   Constitutional: Negative for chills and fever. HENT: Negative for ear pain, mouth sores, sore throat and tinnitus. Eyes: Negative for photophobia, redness and itching. Respiratory: Negative for apnea, choking and stridor. Cardiovascular: Negative for chest pain and palpitations. Gastrointestinal: Positive for abdominal pain. Negative for anal bleeding, constipation and rectal pain. Endocrine: Negative for polydipsia. Genitourinary: Negative for enuresis, flank pain and hematuria. Musculoskeletal: Negative for back pain, joint swelling and myalgias. Skin: Negative for color change and pallor. Allergic/Immunologic: Negative for environmental allergies. Neurological: Negative for syncope and speech difficulty. Psychiatric/Behavioral: Negative for confusion and hallucinations. OBJECTIVE:  Physical Exam:    /78   Pulse 72   Temp 98.3 °F (36.8 °C)   Ht 5' 1\" (1.549 m)   Wt 146 lb 6.4 oz (66.4 kg)   LMP 05/29/2012   SpO2 98%   BMI 27.66 kg/m²      Physical Exam  Constitutional:       Appearance: She is well-developed. HENT:      Head: Normocephalic. Eyes:      Pupils: Pupils are equal, round, and reactive to light.    Neck: Musculoskeletal: Normal range of motion and neck supple. Cardiovascular:      Rate and Rhythm: Normal rate. Pulmonary:      Effort: Pulmonary effort is normal.   Abdominal:      General: There is no distension. Palpations: Abdomen is soft. There is no mass. Tenderness: There is abdominal tenderness. There is no guarding or rebound. Musculoskeletal: Normal range of motion. Skin:     General: Skin is warm. Neurological:      Mental Status: She is alert and oriented to person, place, and time. ASSESSMENT:  1. Abdominal hernia without obstruction and without gangrene, recurrence not specified, unspecified hernia type          PLAN:  Treatment: Will obtain CT A/P since this is not an obvious hernia. Patient counseled on risks, benefits, and alternatives of treatment plan at length. Patient states anunderstanding and willingness to proceed with plan. Orders Placed This Encounter   Procedures    CT ABDOMEN PELVIS WO CONTRAST Additional Contrast? None        No orders of the defined types were placed in this encounter. Follow Up:  No follow-ups on file.       Grecia Villarreal MD

## 2020-07-31 ENCOUNTER — HOSPITAL ENCOUNTER (OUTPATIENT)
Dept: CT IMAGING | Age: 40
Discharge: HOME | End: 2020-07-31
Payer: COMMERCIAL

## 2020-07-31 PROCEDURE — 74176 CT ABD & PELVIS W/O CONTRAST: CPT

## 2020-08-28 ENCOUNTER — TELEPHONE (OUTPATIENT)
Dept: SURGERY | Age: 40
End: 2020-08-28

## 2020-08-28 NOTE — TELEPHONE ENCOUNTER
SPOKE TO  Archana Sharif REGARDING SURGERY (OPEN LEFT INC. HERNIA REPAIR) SCHEDULED @ [facility].  NOTIFIED OF DATES, TIMES AND LOCATION    PHONE ASSESSMENT   COVID 9/2/20 @ [phone] OFFICE  SURGERY - 9/8/20 @ 11, ARRIVE @ 9  P/O - 9/17/20 @ 200    NPO AFTER MIDNIGHT  PATIENT NOT ON BLOOD THINNERS   SENT

## 2020-09-02 ENCOUNTER — NURSE ONLY (OUTPATIENT)
Dept: SURGERY | Age: 40
End: 2020-09-02

## 2020-09-02 ENCOUNTER — HOSPITAL ENCOUNTER (OUTPATIENT)
Age: 40
Setting detail: SPECIMEN
Discharge: HOME | End: 2020-09-02
Payer: COMMERCIAL

## 2020-09-02 VITALS
RESPIRATION RATE: 18 BRPM | SYSTOLIC BLOOD PRESSURE: 110 MMHG | HEART RATE: 75 BPM | TEMPERATURE: 98.1 F | DIASTOLIC BLOOD PRESSURE: 70 MMHG

## 2020-09-02 PROCEDURE — U0002 COVID-19 LAB TEST NON-CDC: HCPCS

## 2020-09-02 NOTE — PROGRESS NOTES
Patient collected pre-op COVID-19 screening instruction and collection supplies given to patient accordingly. Patient denies fever/cough/sob or recent travels. Patient voiced understanding of collection/quarantine instructions. COVID screening lab ordered, collection completed without difficulty, identifiers placed on specimen. AVS given at discharge. Results will be given via mychart or telephone call [facility] Dept will manage any positive test results, the procedure will be rescheduled at a later date).

## 2020-09-02 NOTE — PATIENT INSTRUCTIONS
Pre-Procedure COVID-19 Self Testing  Quarantine Instructions  Day of Surgery Instructions         What to do before my surgery:    All patients scheduled for elective surgery must test for COVID19 72-96 hours prior to the surgery date.  Pre-Procedure COVID-19 Self-Test will be scheduled for you by your provider.  You can receive your Pre-Procedure COVID-19 Self-Test at:  [facility]. [address] [facility], [facility] [address]   If you do not have the COVID-19 test we will cancel or reschedule your procedure   Once you test you must quarantine at home until after your procedure with only your immediate family members or whoever lives with you.  If you must work during your quarantine period, we ask that you continue to practice social distancing, wear a mask that covers your mouth and nose and perform all hand hygiene as recommended by the CDC.  If you must go to the grocery, etc. and cannot get someone to do this for you please wear a mask that covers your mouth and nose and perform all hand hygiene as recommended by the CDC.  Your surgeon's office will notify you with any concerns about your test result. What can I expect on the day of surgery?  Arrive at the time the office or hospital staff tell you on the day of your procedure.  Wear a mask when entering the hospital.     A member of the hospital staff will take your temperature and ask you a few questions as you enter the building.  In abundance of caution for the safety of all our patients and staff, please follow all hospital visitor guidelines in place at the time of your procedure. The staff caring for you will stay in close communication with your loved one and keep them updated on progress.  Please provide a phone number for us to use when communicating with your family or ride home.    When you are ready to discharge, we will notify your family/person with you to bring the car to the front entrance. We will take you to them after you receive all of your discharge instructions.

## 2020-09-03 LAB
SARS-COV-2: NOT DETECTED
SOURCE: NORMAL

## 2020-09-03 RX ORDER — HYDROCODONE BITARTRATE AND ACETAMINOPHEN 5; 325 MG/1; MG/1
1 TABLET ORAL 2 TIMES DAILY PRN
COMMUNITY

## 2020-09-04 ENCOUNTER — ANESTHESIA EVENT (OUTPATIENT)
Dept: OPERATING ROOM | Age: 40
End: 2020-09-04
Payer: COMMERCIAL

## 2020-09-04 ENCOUNTER — TELEPHONE (OUTPATIENT)
Dept: SURGERY | Age: 40
End: 2020-09-04

## 2020-09-04 NOTE — TELEPHONE ENCOUNTER
Spoke with patient to advise negative COVID screening, friendly reminder to continue to self quarantine until procedure. Patient voiced understanding.

## 2020-09-05 ASSESSMENT — ENCOUNTER SYMPTOMS: SHORTNESS OF BREATH: 1

## 2020-09-05 NOTE — ANESTHESIA PRE PROCEDURE
Department of Anesthesiology  Preprocedure Note       Name:  Jose Juan Miles   Age:  35 y.o.  :  1980                                          MRN:  [id number]         Date:  2020      Surgeon: Vandana Rosales):  Tod Taveras MD    Procedure: Procedure(s):  LEFT HERNIA INCISIONAL REPAIR    Medications prior to admission:   Prior to Admission medications    Medication Sig Start Date End Date Taking? Authorizing Provider   HYDROcodone-acetaminophen (NORCO) 5-325 MG per tablet Take 1 tablet by mouth 2 times daily as needed for Pain. Yes Historical Provider, MD   gabapentin (NEURONTIN) 100 MG capsule Take 300 mg by mouth 3 times daily. 1-2 tablets in the morning and 1 -3 tablets at bedtime. Historical Provider, MD   albuterol (PROVENTIL HFA) 108 (90 BASE) MCG/ACT inhaler Inhale 2 puffs into the lungs every 4 hours as needed for Wheezing or Shortness of Breath for 30 days. With spacer (and mask if indicated). Thanks. 13  Annetta Hamilton MD       Current medications:    No current facility-administered medications for this encounter. Current Outpatient Medications   Medication Sig Dispense Refill    HYDROcodone-acetaminophen (NORCO) 5-325 MG per tablet Take 1 tablet by mouth 2 times daily as needed for Pain.  gabapentin (NEURONTIN) 100 MG capsule Take 300 mg by mouth 3 times daily. 1-2 tablets in the morning and 1 -3 tablets at bedtime.  albuterol (PROVENTIL HFA) 108 (90 BASE) MCG/ACT inhaler Inhale 2 puffs into the lungs every 4 hours as needed for Wheezing or Shortness of Breath for 30 days. With spacer (and mask if indicated). Thanks. 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       Past Medical History:        Diagnosis Date    Anesthesia     \"I Wake Up Crying\"    Anxiety     Arthritis     \"Knees\"    Asthma     No Pulmonologist At This Time- dx as teenager    Bronchitis Last Episode In 2013    Chronic back pain     Cracked tooth     Upper Left    Depression     Heart murmur     No Cardiologist At This Time\"born with a heart murmur\"    Heartburn     \"Frequent Heartburn\"    History of blood transfusion 1997    No Reaction To Blood Transfusion Received    [place] (hard of hearing)     \"Both Ears\"    Irritable bowel disease     Migraines Last Migraines 8-25-17    Panic attacks     Pneumonia Last Episode In 2013    Shortness of breath on exertion     Teeth missing     Upper And Lower    Urinary, incontinence, stress female     Uses contact lenses     Wears glasses        Past Surgical History:        Procedure Laterality Date    BACK SURGERY      \"C5= cervical fusion in June 2020\"at [place]    DENTAL SURGERY      Teeth Extracted In Past    DILATION AND CURETTAGE OF UTERUS  6-20-12    Laparoscopy, \"Cyst Right Ovary\"    HERNIA REPAIR  3-14    Umbilical Hernia Repair With Mesh    HERNIA REPAIR  05/15/2015    Robotic Laparoscopic Ventral Hernia Repair With Mesh    HYSTERECTOMY VAGINAL  05/2013    KNEE ARTHROSCOPY Left     chondroplasty open proximal patella    LAPAROSCOPY  6-20-12    D & C, \"Cyst Right Ovary\"    LAPAROSCOPY  11-12    TONSILLECTOMY AND ADENOIDECTOMY  1992    TUBAL LIGATION  2001    WISDOM TOOTH EXTRACTION      4 [place] Teeth Extracted In Past       Social History:    Social History     Tobacco Use    Smoking status: Current Every Day Smoker     Packs/day: 1.00     Years: 23.00     Pack years: 23.00     Types: Cigarettes Evaluation  Patient summary reviewed and Nursing notes reviewed history of anesthetic complications:   Airway: Mallampati: II  TM distance: >3 FB   Neck ROM: limited  Mouth opening: > = 3 FB Dental: normal exam         Pulmonary:   (+) shortness of breath:  asthma: current smoker          Patient did not smoke on day of surgery. Cardiovascular:  Exercise tolerance: good (>4 METS),   (+) WHITMORE: after ambulating 1 flight of stairs,     (-) past MI and CAD    ECG reviewed               Beta Blocker:  Not on Beta Blocker         Neuro/Psych:   (+) headaches: migraine headaches, depression/anxiety             GI/Hepatic/Renal:   (+) GERD: well controlled,           Endo/Other:                     Abdominal:           Vascular:                                      Anesthesia Plan      general     ASA 3     (Limited cervical motion. Due to neck fusion recently. GERD history but none today)  Induction: intravenous. Anesthetic plan and risks discussed with patient. Plan discussed with CRNA and attending.     Attending anesthesiologist reviewed and agrees with Pre Eval content            ENID Santiago - CRNA   9/5/2020

## 2020-09-08 ENCOUNTER — HOSPITAL ENCOUNTER (OUTPATIENT)
Age: 40
Setting detail: OUTPATIENT SURGERY
Discharge: HOME | End: 2020-09-08
Attending: SURGERY | Admitting: SURGERY
Payer: COMMERCIAL

## 2020-09-08 ENCOUNTER — ANESTHESIA (OUTPATIENT)
Dept: OPERATING ROOM | Age: 40
End: 2020-09-08
Payer: COMMERCIAL

## 2020-09-08 VITALS
RESPIRATION RATE: 16 BRPM | OXYGEN SATURATION: 98 % | SYSTOLIC BLOOD PRESSURE: 116 MMHG | BODY MASS INDEX: 27.56 KG/M2 | WEIGHT: 146 LBS | HEART RATE: 70 BPM | TEMPERATURE: 97.6 F | DIASTOLIC BLOOD PRESSURE: 69 MMHG | HEIGHT: 61 IN

## 2020-09-08 VITALS
RESPIRATION RATE: 11 BRPM | DIASTOLIC BLOOD PRESSURE: 98 MMHG | TEMPERATURE: 96.3 F | OXYGEN SATURATION: 96 % | SYSTOLIC BLOOD PRESSURE: 130 MMHG

## 2020-09-08 LAB
ANION GAP SERPL CALCULATED.3IONS-SCNC: 8 MMOL/L (ref 4–16)
BUN BLDV-MCNC: 6 MG/DL (ref 6–23)
CALCIUM SERPL-MCNC: 8.9 MG/DL (ref 8.3–10.6)
CHLORIDE BLD-SCNC: 105 MMOL/L (ref 99–110)
CO2: 24 MMOL/L (ref 21–32)
CREAT SERPL-MCNC: 0.8 MG/DL (ref 0.6–1.1)
GFR AFRICAN AMERICAN: >60 ML/MIN/1.73M2
GFR NON-AFRICAN AMERICAN: >60 ML/MIN/1.73M2
GLUCOSE BLD-MCNC: 95 MG/DL (ref 70–99)
HCT VFR BLD CALC: 43.4 % (ref 37–47)
HEMOGLOBIN: 13.7 GM/DL (ref 12.5–16)
MCH RBC QN AUTO: 31.8 PG (ref 27–31)
MCHC RBC AUTO-ENTMCNC: 31.6 % (ref 32–36)
MCV RBC AUTO: 100.7 FL (ref 78–100)
PDW BLD-RTO: 13.9 % (ref 11.7–14.9)
PLATELET # BLD: 257 K/CU MM (ref 140–440)
PMV BLD AUTO: 11.4 FL (ref 7.5–11.1)
POTASSIUM SERPL-SCNC: 4.1 MMOL/L (ref 3.5–5.1)
RBC # BLD: 4.31 M/CU MM (ref 4.2–5.4)
SODIUM BLD-SCNC: 137 MMOL/L (ref 135–145)
WBC # BLD: 7.3 K/CU MM (ref 4–10.5)

## 2020-09-08 PROCEDURE — 80048 BASIC METABOLIC PNL TOTAL CA: CPT

## 2020-09-08 PROCEDURE — APPNB30 APP NON BILLABLE TIME 0-30 MINS: Performed by: PHYSICIAN ASSISTANT

## 2020-09-08 PROCEDURE — 85027 COMPLETE CBC AUTOMATED: CPT

## 2020-09-08 PROCEDURE — 7100000000 HC PACU RECOVERY - FIRST 15 MIN: Performed by: SURGERY

## 2020-09-08 PROCEDURE — 3700000000 HC ANESTHESIA ATTENDED CARE: Performed by: SURGERY

## 2020-09-08 PROCEDURE — 2580000003 HC RX 258: Performed by: ANESTHESIOLOGY

## 2020-09-08 PROCEDURE — 6370000000 HC RX 637 (ALT 250 FOR IP): Performed by: ANESTHESIOLOGY

## 2020-09-08 PROCEDURE — 2500000003 HC RX 250 WO HCPCS: Performed by: NURSE ANESTHETIST, CERTIFIED REGISTERED

## 2020-09-08 PROCEDURE — 3600000013 HC SURGERY LEVEL 3 ADDTL 15MIN: Performed by: SURGERY

## 2020-09-08 PROCEDURE — 3600000003 HC SURGERY LEVEL 3 BASE: Performed by: SURGERY

## 2020-09-08 PROCEDURE — 7100000010 HC PHASE II RECOVERY - FIRST 15 MIN: Performed by: SURGERY

## 2020-09-08 PROCEDURE — 7100000011 HC PHASE II RECOVERY - ADDTL 15 MIN: Performed by: SURGERY

## 2020-09-08 PROCEDURE — 2709999900 HC NON-CHARGEABLE SUPPLY: Performed by: SURGERY

## 2020-09-08 PROCEDURE — 49560 PR REPAIR INCISIONAL HERNIA,REDUCIBLE: CPT | Performed by: SURGERY

## 2020-09-08 PROCEDURE — 2720000010 HC SURG SUPPLY STERILE: Performed by: SURGERY

## 2020-09-08 PROCEDURE — C1781 MESH (IMPLANTABLE): HCPCS | Performed by: SURGERY

## 2020-09-08 PROCEDURE — 3700000001 HC ADD 15 MINUTES (ANESTHESIA): Performed by: SURGERY

## 2020-09-08 PROCEDURE — 49568 PR IMPLANT MESH HERNIA REPAIR/DEBRIDEMENT CLOSURE: CPT | Performed by: SURGERY

## 2020-09-08 PROCEDURE — 2580000003 HC RX 258: Performed by: PHYSICIAN ASSISTANT

## 2020-09-08 PROCEDURE — 6360000002 HC RX W HCPCS: Performed by: PHYSICIAN ASSISTANT

## 2020-09-08 PROCEDURE — 6360000002 HC RX W HCPCS

## 2020-09-08 PROCEDURE — 6360000002 HC RX W HCPCS: Performed by: NURSE ANESTHETIST, CERTIFIED REGISTERED

## 2020-09-08 PROCEDURE — 2500000003 HC RX 250 WO HCPCS: Performed by: SURGERY

## 2020-09-08 PROCEDURE — 7100000001 HC PACU RECOVERY - ADDTL 15 MIN: Performed by: SURGERY

## 2020-09-08 PROCEDURE — 6360000002 HC RX W HCPCS: Performed by: ANESTHESIOLOGY

## 2020-09-08 DEVICE — IMPLANTABLE DEVICE: Type: IMPLANTABLE DEVICE | Site: ABDOMEN | Status: FUNCTIONAL

## 2020-09-08 RX ORDER — FENTANYL CITRATE 50 UG/ML
25 INJECTION, SOLUTION INTRAMUSCULAR; INTRAVENOUS EVERY 5 MIN PRN
Status: DISCONTINUED | OUTPATIENT
Start: 2020-09-08 | End: 2020-09-08 | Stop reason: HOSPADM

## 2020-09-08 RX ORDER — MIDAZOLAM HYDROCHLORIDE 1 MG/ML
INJECTION INTRAMUSCULAR; INTRAVENOUS PRN
Status: DISCONTINUED | OUTPATIENT
Start: 2020-09-08 | End: 2020-09-08 | Stop reason: SDUPTHER

## 2020-09-08 RX ORDER — ROCURONIUM BROMIDE 10 MG/ML
INJECTION, SOLUTION INTRAVENOUS PRN
Status: DISCONTINUED | OUTPATIENT
Start: 2020-09-08 | End: 2020-09-08 | Stop reason: SDUPTHER

## 2020-09-08 RX ORDER — ONDANSETRON 2 MG/ML
4 INJECTION INTRAMUSCULAR; INTRAVENOUS
Status: DISCONTINUED | OUTPATIENT
Start: 2020-09-08 | End: 2020-09-08 | Stop reason: HOSPADM

## 2020-09-08 RX ORDER — BUPIVACAINE HYDROCHLORIDE 5 MG/ML
INJECTION, SOLUTION EPIDURAL; INTRACAUDAL
Status: COMPLETED | OUTPATIENT
Start: 2020-09-08 | End: 2020-09-08

## 2020-09-08 RX ORDER — HYDROMORPHONE HCL 110MG/55ML
0.5 PATIENT CONTROLLED ANALGESIA SYRINGE INTRAVENOUS EVERY 5 MIN PRN
Status: DISCONTINUED | OUTPATIENT
Start: 2020-09-08 | End: 2020-09-08 | Stop reason: HOSPADM

## 2020-09-08 RX ORDER — FENTANYL CITRATE 50 UG/ML
INJECTION, SOLUTION INTRAMUSCULAR; INTRAVENOUS PRN
Status: DISCONTINUED | OUTPATIENT
Start: 2020-09-08 | End: 2020-09-08 | Stop reason: SDUPTHER

## 2020-09-08 RX ORDER — HYDRALAZINE HYDROCHLORIDE 20 MG/ML
5 INJECTION INTRAMUSCULAR; INTRAVENOUS EVERY 10 MIN PRN
Status: DISCONTINUED | OUTPATIENT
Start: 2020-09-08 | End: 2020-09-08 | Stop reason: HOSPADM

## 2020-09-08 RX ORDER — MIDAZOLAM HYDROCHLORIDE 1 MG/ML
INJECTION INTRAMUSCULAR; INTRAVENOUS
Status: COMPLETED
Start: 2020-09-08 | End: 2020-09-08

## 2020-09-08 RX ORDER — FENTANYL CITRATE 50 UG/ML
50 INJECTION, SOLUTION INTRAMUSCULAR; INTRAVENOUS EVERY 5 MIN PRN
Status: DISCONTINUED | OUTPATIENT
Start: 2020-09-08 | End: 2020-09-08 | Stop reason: HOSPADM

## 2020-09-08 RX ORDER — MIDAZOLAM HYDROCHLORIDE 1 MG/ML
0.5 INJECTION INTRAMUSCULAR; INTRAVENOUS ONCE
Status: COMPLETED | OUTPATIENT
Start: 2020-09-08 | End: 2020-09-08

## 2020-09-08 RX ORDER — PROPOFOL 10 MG/ML
INJECTION, EMULSION INTRAVENOUS PRN
Status: DISCONTINUED | OUTPATIENT
Start: 2020-09-08 | End: 2020-09-08 | Stop reason: SDUPTHER

## 2020-09-08 RX ORDER — LABETALOL HYDROCHLORIDE 5 MG/ML
5 INJECTION, SOLUTION INTRAVENOUS EVERY 10 MIN PRN
Status: DISCONTINUED | OUTPATIENT
Start: 2020-09-08 | End: 2020-09-08 | Stop reason: HOSPADM

## 2020-09-08 RX ORDER — HYDROCODONE BITARTRATE AND ACETAMINOPHEN 5; 325 MG/1; MG/1
1 TABLET ORAL ONCE
Status: COMPLETED | OUTPATIENT
Start: 2020-09-08 | End: 2020-09-08

## 2020-09-08 RX ORDER — LIDOCAINE HYDROCHLORIDE 20 MG/ML
INJECTION, SOLUTION INFILTRATION; PERINEURAL PRN
Status: DISCONTINUED | OUTPATIENT
Start: 2020-09-08 | End: 2020-09-08 | Stop reason: SDUPTHER

## 2020-09-08 RX ORDER — HYDROMORPHONE HCL 110MG/55ML
0.25 PATIENT CONTROLLED ANALGESIA SYRINGE INTRAVENOUS EVERY 5 MIN PRN
Status: DISCONTINUED | OUTPATIENT
Start: 2020-09-08 | End: 2020-09-08 | Stop reason: HOSPADM

## 2020-09-08 RX ORDER — HYDROCODONE BITARTRATE AND ACETAMINOPHEN 5; 325 MG/1; MG/1
1 TABLET ORAL EVERY 6 HOURS PRN
Qty: 20 TABLET | Refills: 0 | Status: SHIPPED | OUTPATIENT
Start: 2020-09-08 | End: 2020-09-15

## 2020-09-08 RX ORDER — PROMETHAZINE HYDROCHLORIDE 25 MG/ML
6.25 INJECTION, SOLUTION INTRAMUSCULAR; INTRAVENOUS
Status: DISCONTINUED | OUTPATIENT
Start: 2020-09-08 | End: 2020-09-08 | Stop reason: HOSPADM

## 2020-09-08 RX ORDER — SODIUM CHLORIDE, SODIUM LACTATE, POTASSIUM CHLORIDE, CALCIUM CHLORIDE 600; 310; 30; 20 MG/100ML; MG/100ML; MG/100ML; MG/100ML
INJECTION, SOLUTION INTRAVENOUS CONTINUOUS
Status: DISCONTINUED | OUTPATIENT
Start: 2020-09-08 | End: 2020-09-08 | Stop reason: HOSPADM

## 2020-09-08 RX ADMIN — FENTANYL CITRATE 50 MCG: 50 INJECTION INTRAMUSCULAR; INTRAVENOUS at 12:27

## 2020-09-08 RX ADMIN — SODIUM CHLORIDE, POTASSIUM CHLORIDE, SODIUM LACTATE AND CALCIUM CHLORIDE: 600; 310; 30; 20 INJECTION, SOLUTION INTRAVENOUS at 13:18

## 2020-09-08 RX ADMIN — SUGAMMADEX 100 MG: 100 INJECTION, SOLUTION INTRAVENOUS at 13:16

## 2020-09-08 RX ADMIN — CEFAZOLIN SODIUM 2 G: 10 INJECTION, POWDER, FOR SOLUTION INTRAVENOUS at 12:34

## 2020-09-08 RX ADMIN — MIDAZOLAM HYDROCHLORIDE 0.5 MG: 1 INJECTION, SOLUTION INTRAMUSCULAR; INTRAVENOUS at 13:53

## 2020-09-08 RX ADMIN — ROCURONIUM BROMIDE 50 MG: 10 INJECTION INTRAVENOUS at 12:32

## 2020-09-08 RX ADMIN — HYDROMORPHONE HYDROCHLORIDE 0.25 MG: 2 INJECTION INTRAMUSCULAR; INTRAVENOUS; SUBCUTANEOUS at 13:57

## 2020-09-08 RX ADMIN — MIDAZOLAM HYDROCHLORIDE 0.5 MG: 1 INJECTION INTRAMUSCULAR; INTRAVENOUS at 13:53

## 2020-09-08 RX ADMIN — FENTANYL CITRATE 50 MCG: 50 INJECTION INTRAMUSCULAR; INTRAVENOUS at 13:03

## 2020-09-08 RX ADMIN — HYDROMORPHONE HYDROCHLORIDE 0.5 MG: 2 INJECTION INTRAMUSCULAR; INTRAVENOUS; SUBCUTANEOUS at 13:38

## 2020-09-08 RX ADMIN — SUGAMMADEX 100 MG: 100 INJECTION, SOLUTION INTRAVENOUS at 13:13

## 2020-09-08 RX ADMIN — FENTANYL CITRATE 50 MCG: 50 INJECTION INTRAMUSCULAR; INTRAVENOUS at 13:28

## 2020-09-08 RX ADMIN — MIDAZOLAM 2 MG: 1 INJECTION INTRAMUSCULAR; INTRAVENOUS at 12:19

## 2020-09-08 RX ADMIN — HYDROMORPHONE HYDROCHLORIDE 0.5 MG: 2 INJECTION INTRAMUSCULAR; INTRAVENOUS; SUBCUTANEOUS at 13:45

## 2020-09-08 RX ADMIN — SODIUM CHLORIDE, POTASSIUM CHLORIDE, SODIUM LACTATE AND CALCIUM CHLORIDE: 600; 310; 30; 20 INJECTION, SOLUTION INTRAVENOUS at 10:14

## 2020-09-08 RX ADMIN — PROPOFOL 200 MG: 10 INJECTION, EMULSION INTRAVENOUS at 12:30

## 2020-09-08 RX ADMIN — LIDOCAINE HYDROCHLORIDE 100 MG: 20 INJECTION, SOLUTION INFILTRATION; PERINEURAL at 12:30

## 2020-09-08 RX ADMIN — HYDROCODONE BITARTRATE AND ACETAMINOPHEN 1 TABLET: 5; 325 TABLET ORAL at 15:27

## 2020-09-08 ASSESSMENT — PAIN DESCRIPTION - ORIENTATION
ORIENTATION: LEFT
ORIENTATION: LEFT
ORIENTATION: LEFT;LOWER
ORIENTATION: LEFT
ORIENTATION: LEFT

## 2020-09-08 ASSESSMENT — PAIN SCALES - GENERAL
PAINLEVEL_OUTOF10: 7
PAINLEVEL_OUTOF10: 6
PAINLEVEL_OUTOF10: 4
PAINLEVEL_OUTOF10: 6
PAINLEVEL_OUTOF10: 7
PAINLEVEL_OUTOF10: 0
PAINLEVEL_OUTOF10: 6

## 2020-09-08 ASSESSMENT — PULMONARY FUNCTION TESTS
PIF_VALUE: 19
PIF_VALUE: 1
PIF_VALUE: 19
PIF_VALUE: 0
PIF_VALUE: 19
PIF_VALUE: 18
PIF_VALUE: 0
PIF_VALUE: 19
PIF_VALUE: 18
PIF_VALUE: 1
PIF_VALUE: 20
PIF_VALUE: 19
PIF_VALUE: 18
PIF_VALUE: 19
PIF_VALUE: 1
PIF_VALUE: 20
PIF_VALUE: 19
PIF_VALUE: 19
PIF_VALUE: 0
PIF_VALUE: 18
PIF_VALUE: 19
PIF_VALUE: 22
PIF_VALUE: 18
PIF_VALUE: 18
PIF_VALUE: 0
PIF_VALUE: 18
PIF_VALUE: 18
PIF_VALUE: 20
PIF_VALUE: 19
PIF_VALUE: 19
PIF_VALUE: 18
PIF_VALUE: 20
PIF_VALUE: 1
PIF_VALUE: 0
PIF_VALUE: 18
PIF_VALUE: 18
PIF_VALUE: 20
PIF_VALUE: 20
PIF_VALUE: 19
PIF_VALUE: 20
PIF_VALUE: 20
PIF_VALUE: 18
PIF_VALUE: 0
PIF_VALUE: 19
PIF_VALUE: 2
PIF_VALUE: 3
PIF_VALUE: 0
PIF_VALUE: 19
PIF_VALUE: 20
PIF_VALUE: 19
PIF_VALUE: 0
PIF_VALUE: 2
PIF_VALUE: 31
PIF_VALUE: 19
PIF_VALUE: 21
PIF_VALUE: 20
PIF_VALUE: 19
PIF_VALUE: 20
PIF_VALUE: 19
PIF_VALUE: 19
PIF_VALUE: 31
PIF_VALUE: 21
PIF_VALUE: 20
PIF_VALUE: 0
PIF_VALUE: 20

## 2020-09-08 ASSESSMENT — PAIN DESCRIPTION - LOCATION
LOCATION: ABDOMEN

## 2020-09-08 ASSESSMENT — PAIN DESCRIPTION - PROGRESSION: CLINICAL_PROGRESSION: GRADUALLY IMPROVING

## 2020-09-08 ASSESSMENT — PAIN DESCRIPTION - PAIN TYPE
TYPE: SURGICAL PAIN

## 2020-09-08 ASSESSMENT — PAIN - FUNCTIONAL ASSESSMENT: PAIN_FUNCTIONAL_ASSESSMENT: 0-10

## 2020-09-08 ASSESSMENT — PAIN DESCRIPTION - DESCRIPTORS: DESCRIPTORS: SORE

## 2020-09-08 ASSESSMENT — LIFESTYLE VARIABLES: SMOKING_STATUS: 1

## 2020-09-08 ASSESSMENT — ENCOUNTER SYMPTOMS: DYSPNEA ACTIVITY LEVEL: AFTER AMBULATING 1 FLIGHT OF STAIRS

## 2020-09-08 NOTE — H&P
Received    [place] (hard of hearing)       \"Both Ears\"    Irritable bowel disease      Migraines Last Migraines 8-25-17    Panic attacks      Pneumonia Last Episode In 2013    Shortness of breath on exertion      Teeth missing       Upper And Lower    Urinary, incontinence, stress female      Uses contact lenses      Wears glasses             Family History         Family History   Adopted: Yes   Problem Relation Age of Onset    Cancer Father           Melanoma    Other Father           Diverticulitis    Arthritis Brother      Hearing Loss Daughter      Scoliosis Daughter      Other Son           \"Massive Migraines\"    Vision Loss Son           Wears Glasses    Kidney Disease Son           \"Stage One\"        Social History               Socioeconomic History    Marital status:        Spouse name: Not on file    Number of children: Not on file    Years of education: Not on file    Highest education level: Not on file   Occupational History    Not on file   Social Needs    Financial resource strain: Not on file    Food insecurity       Worry: Not on file       Inability: Not on file    Transportation needs       Medical: Not on file       Non-medical: Not on file   Tobacco Use    Smoking status: Current Every Day Smoker       Packs/day: 1.00       Years: 23.00       Pack years: 23.00       Types: Cigarettes, E-Cigarettes       Start date: 12    Smokeless tobacco: Never Used   Substance and Sexual Activity    Alcohol use: Yes       Comment: \"Occ.  Maybe Once A Month\"    Drug use: No    Sexual activity: Yes       Partners: Male   Lifestyle    Physical activity       Days per week: Not on file       Minutes per session: Not on file    Stress: Not on file   Relationships    Social connections       Talks on phone: Not on file       Gets together: Not on file       Attends Pentecostalism service: Not on file       Active member of club or organization: Not on file       Attends meetings of clubs or organizations: Not on file       Relationship status: Not on file    Intimate partner violence       Fear of current or ex partner: Not on file       Emotionally abused: Not on file       Physically abused: Not on file       Forced sexual activity: Not on file   Other Topics Concern    Not on file   Social History Narrative    Not on file           Current Facility-Administered Medications          Current Outpatient Medications   Medication Sig Dispense Refill    gabapentin (NEURONTIN) 100 MG capsule Take 100 mg by mouth 2 times daily. 1-2 tablets in the morning and 1 -3 tablets at bedtime.        albuterol (PROVENTIL HFA) 108 (90 BASE) MCG/ACT inhaler Inhale 2 puffs into the lungs every 4 hours as needed for Wheezing or Shortness of Breath for 30 days. With spacer (and mask if indicated). Thanks. 1 Inhaler 1      No current facility-administered medications for this visit. Allergies   Allergen Reactions    Lexapro [Escitalopram]         \"Shot Up My Blood Pressure And Pulse, Dizziness Heart Palpitations\"    Cymbalta [Duloxetine Hcl] Other (See Comments)       Can't remember    Zoloft [Sertraline Hcl] Nausea And Vomiting        Review of Systems:          Review of Systems   Constitutional: Negative for chills and fever. HENT: Negative for ear pain, mouth sores, sore throat and tinnitus. Eyes: Negative for photophobia, redness and itching. Respiratory: Negative for apnea, choking and stridor. Cardiovascular: Negative for chest pain and palpitations. Gastrointestinal: Positive for abdominal pain. Negative for anal bleeding, constipation and rectal pain. Endocrine: Negative for polydipsia. Genitourinary: Negative for enuresis, flank pain and hematuria. Musculoskeletal: Negative for back pain, joint swelling and myalgias. Skin: Negative for color change and pallor. Allergic/Immunologic: Negative for environmental allergies.    Neurological: Negative for syncope and speech difficulty. Psychiatric/Behavioral: Negative for confusion and hallucinations.            OBJECTIVE:  Physical Exam:    /78   Pulse 72   Temp 98.3 °F (36.8 °C)   Ht 5' 1\" (1.549 m)   Wt 146 lb 6.4 oz (66.4 kg)   LMP 05/29/2012   SpO2 98%   BMI 27.66 kg/m²       Physical Exam  Constitutional:       Appearance: She is well-developed. HENT:      Head: Normocephalic. Eyes:      Pupils: Pupils are equal, round, and reactive to light. Neck:      Musculoskeletal: Normal range of motion and neck supple. Cardiovascular:      Rate and Rhythm: Normal rate. Pulmonary:      Effort: Pulmonary effort is normal.   Abdominal:      General: There is no distension. Palpations: Abdomen is soft. There is no mass. Tenderness: There is abdominal tenderness. There is no guarding or rebound. Musculoskeletal: Normal range of motion. Skin:     General: Skin is warm. Neurological:      Mental Status: She is alert and oriented to person, place, and time.          ASSESSMENT:  1. Abdominal hernia without obstruction and without gangrene, recurrence not specified, unspecified hernia type            PLAN:  Treatment: Will proceed with LLQ incisional hernia repair. Patient counseled on risks, benefits, and alternatives of treatment plan at length. Patient states an understanding and willingness to proceed with plan. The patient was counseled at length about the risks of rosalinda Covid-19 during their perioperative period and any recovery window from their procedure. The patient was made aware that rosalinda Covid-19  may worsen their prognosis for recovering from their procedure  and lend to a higher morbidity and/or mortality risk. All material risks, benefits, and reasonable alternatives including postponing the procedure were discussed. The patient does wish to proceed with the procedure at this time.       Jennifer Thompson PA-C

## 2020-09-08 NOTE — ANESTHESIA POSTPROCEDURE EVALUATION
Department of Anesthesiology  Postprocedure Note    Patient: Nicola Love  MRN: [id number]  YOB: 1980  Date of evaluation: 9/8/2020  Time:  1:34 PM     Procedure Summary     Date:  09/08/20 Room / Location:  [address]    Anesthesia Start:  0948 Anesthesia Stop:  6871    Procedure:  LEFT HERNIA INCISIONAL REPAIR (Left Abdomen) Diagnosis:  (LEFT INCISIONAL HERNIA)    Surgeon:  Blank Yanez MD Responsible Provider:  ENID Garduno - ELAN    Anesthesia Type:  general ASA Status:  3          Anesthesia Type: general    Antonio Phase I:      Antonio Phase II:      Last vitals: Reviewed and per EMR flowsheets.        Anesthesia Post Evaluation    Patient location during evaluation: PACU  Patient participation: complete - patient participated  Level of consciousness: awake  Airway patency: patent  Nausea & Vomiting: no vomiting and no nausea  Complications: no  Cardiovascular status: blood pressure returned to baseline and hemodynamically stable  Respiratory status: acceptable, nonlabored ventilation and spontaneous ventilation  Hydration status: stable

## 2020-09-08 NOTE — OP NOTE
Procedure Note    Indications: Symptomatic left lower quadrant incisional  hernia    Pre-operative Diagnosis: Left lower quadrant incisional hernia    Post-operative Diagnosis: same    Procedure: Open incisional Herniorraphy with mesh    Surgeon: Lynne Mane MD    Anesthesia: General endotracheal anesthesia    ASA Class: 2    Estimated Blood Loss:  Minimal           Total IV Fluids: 500 ml           Specimens:  none           Implants: small ventralex mesh           Complications:  None; patient tolerated the procedure well. Procedure Details   The patient was seen in the Holding Room. The risks, benefits, complications, treatment options, and expected outcomes were discussed with the patient. The possibilities of reaction to medication, pulmonary aspiration, perforation of viscus, bleeding, recurrent infection, the need for additional procedures, failure to diagnose a condition, and creating a complication requiring transfusion or operation were discussed with the patient. The patient concurred with the proposed plan, giving informed consent. The site of surgery properly noted/marked. The patient was taken to Operating Room , identified as [facility] and the procedure verified as Umbilical Herniorrhaphy. A Time Out was held and the above information confirmed. The patient was placed supine. After establishing general anesthesia, the abdomen was prepped and draped in standard fashion. 0.25% marcaine was used for local anesthetic. A transverse incision was created. Dissection was carried down to the hernia sac located above the fascia and was mobilized from surrounding structures. Intact fascia was identified circumferentially around the defect. A small Ventralex mesh was fashioned to size and placed over the defect in the pre-peritoneal space. The mesh was secured with a 2-0 Prolene  The soft tissue was irrigated and closed in layers. Hemostasis was confirmed.   The skin incision was closed in layers with a 4-0 Vicryl subcuticular closure. Steri-Strips were applied at the end of the operation. Instrument, sponge, and needle counts were correct prior to closure and at the conclusion of the case.              Nehal Torres MD

## 2020-09-08 NOTE — PROGRESS NOTES
1330- pt arrived from OR and placed on all PACU monitoring devices. Report received from [address] [place] [address] and [facility]. Respirations even and unlabored. Left lower abdominal incision well approximated with surgical glue dry and intact. Abdomen soft. VSS  1353- pt remains very tearful and anxious. PT states she always wakes up crying after surgery.  notified and new orders received. 1405- pt resting with eyes closed. VSS.   1450- Pt to [facility], report given to Amber MIRELES at bedside.

## 2020-09-08 NOTE — PROGRESS NOTES
1434 Patient transferred from [facility] via cart to [facility], she is resting quietly with her eyes closed. Incision left lower abdomen closed with surgical glue. 1450 Patient states pain score is 5-6 . 1510 Patient is sitting up drinking apple juice and eating trisha crackers. 1527 Patient medicated for pain score of 6 1540 Patient up to bathroom with assistance to void. 1620 Patient discharged to home, her  will drive her. Incision is dry and intact. No drainage noted.

## 2020-09-17 ENCOUNTER — OFFICE VISIT (OUTPATIENT)
Dept: SURGERY | Age: 40
End: 2020-09-17

## 2020-09-17 VITALS
WEIGHT: 146.3 LBS | HEIGHT: 61 IN | BODY MASS INDEX: 27.62 KG/M2 | SYSTOLIC BLOOD PRESSURE: 124 MMHG | HEART RATE: 80 BPM | TEMPERATURE: 98.3 F | OXYGEN SATURATION: 95 % | DIASTOLIC BLOOD PRESSURE: 76 MMHG

## 2020-09-17 PROCEDURE — 99024 POSTOP FOLLOW-UP VISIT: CPT | Performed by: SURGERY

## 2020-09-21 NOTE — PROGRESS NOTES
Chief Complaint   Patient presents with    Post-Op Check     1st PO Open left inc hernia repair @ [facility] 9/8/20         SUBJECTIVE:  Patient here for post op visit. Pain is minimal.  Wounds: minbruising and no discharge.     Past Surgical History:   Procedure Laterality Date    BACK SURGERY      \"C5= cervical fusion in June 2020\"at [place]    DENTAL SURGERY      Teeth Extracted In Past    DILATION AND CURETTAGE OF UTERUS  6-20-12    Laparoscopy, \"Cyst Right Ovary\"    HERNIA REPAIR  3-79    Umbilical Hernia Repair With Mesh    HERNIA REPAIR  05/15/2015    Robotic Laparoscopic Ventral Hernia Repair With Mesh    HERNIA REPAIR  09/08/2020    HYSTERECTOMY VAGINAL  05/2013    KNEE ARTHROSCOPY Left     chondroplasty open proximal patella    LAPAROSCOPY  6-20-12    D & C, \"Cyst Right Ovary\"    LAPAROSCOPY  11-12    TONSILLECTOMY AND ADENOIDECTOMY  1992    TUBAL LIGATION  2001    VENTRAL HERNIA REPAIR Left 9/8/2020    LEFT HERNIA INCISIONAL REPAIR performed by Marya Burrows MD at [place] EXTRACTION      4 [place] Teeth Extracted In Past     Past Medical History:   Diagnosis Date    Anesthesia     \"I Wake Up Crying\"    Anxiety     Arthritis     \"Knees\"    Asthma     No Pulmonologist At This Time- dx as teenager    Bronchitis Last Episode In 2013    Chronic back pain     Cracked tooth     Upper Left    Depression     Heart murmur     No Cardiologist At This Time\"born with a heart murmur\"    Heartburn     \"Frequent Heartburn\"    History of blood transfusion 1997    No Reaction To Blood Transfusion Received    [place] (hard of hearing)     \"Both Ears\"    Irritable bowel disease     Migraines Last Migraines 8-25-17    Panic attacks     Pneumonia Last Episode In 2013    Shortness of breath on exertion     Teeth missing     Upper And Lower    Urinary, incontinence, stress female     Uses contact lenses     Wears glasses      Family History   Adopted: Yes   Problem Relation Age of Onset well on this post operative check. Wounds well healed. No diagnosis found. PLAN:  Continue same  Increase activity as tolerated        No orders of the defined types were placed in this encounter. No orders of the defined types were placed in this encounter. Follow Up: No follow-ups on file.     Grecia Villarreal MD

## 2020-10-05 ENCOUNTER — VIRTUAL VISIT (OUTPATIENT)
Dept: SURGERY | Age: 40
End: 2020-10-05

## 2020-10-05 PROCEDURE — 99024 POSTOP FOLLOW-UP VISIT: CPT | Performed by: PHYSICIAN ASSISTANT

## 2020-10-05 NOTE — PROGRESS NOTES
Krystal Mead is a 35 y.o. female evaluated via telephone on 10/5/2020. Consent:  She and/or health care decision maker is aware that that she may receive a bill for this telephone service, depending on her insurance coverage, and has provided verbal consent to proceed: No - Not billable      Documentation:  I communicated with the patient and/or health care decision maker about how she is doing post-op. Details of this discussion including any medical advice provided:     SUBJECTIVE:  Patient presents today via virtual visit for her 2nd post op visit following open abdominal incisional hernia repair. Pt reports that pain is none. Pt is  tolerating a regular diet, but does feel full and distended soon after eating. She does have intermittent dysphagia with certain meats, dry foods, and also has reflux, which has been chronic for her. She reports that she has been w/u with GI, and had an EGD about 2 years ago. BMs are WNL. Incisions: well healed. Pt reports that she has been taking it easy lately, mostly because of her recent back surgery and associated pain. Recommend to follow activity restrictions related to her back. From a general surgery standpoint, she has no restrictions and can increase her activity as tolerated. In relation to her GI concerns, she would like referral to an alternative provider. I will refer to Belinda Saavedra for further w/u. I affirm this is a Patient Initiated Episode with a Patient who has not had a related appointment within my department in the past 7 days or scheduled within the next 24 hours.     Patient identification was verified at the start of the visit: Yes    Total Time: not billable - global period      Kate Wray PA-C

## 2020-11-02 ENCOUNTER — OFFICE VISIT (OUTPATIENT)
Dept: GASTROENTEROLOGY | Age: 40
End: 2020-11-02
Payer: COMMERCIAL

## 2020-11-02 VITALS
SYSTOLIC BLOOD PRESSURE: 110 MMHG | TEMPERATURE: 98.1 F | BODY MASS INDEX: 28.64 KG/M2 | DIASTOLIC BLOOD PRESSURE: 70 MMHG | WEIGHT: 151.7 LBS | HEIGHT: 61 IN

## 2020-11-02 PROCEDURE — 99203 OFFICE O/P NEW LOW 30 MIN: CPT | Performed by: NURSE PRACTITIONER

## 2020-11-02 ASSESSMENT — ENCOUNTER SYMPTOMS
BLOOD IN STOOL: 0
VOMITING: 0
COUGH: 1
CONSTIPATION: 0
EYE PAIN: 0
BACK PAIN: 1
ABDOMINAL PAIN: 1
SHORTNESS OF BREATH: 0
PHOTOPHOBIA: 0
NAUSEA: 0
DIARRHEA: 0
COLOR CHANGE: 0
WHEEZING: 0

## 2020-11-02 NOTE — PROGRESS NOTES
Ariane Reich 35 y.o. female was seen by BOBBY Preciado on 11/2/2020     Wt Readings from Last 3 Encounters:   11/02/20 151 lb 11.2 oz (68.8 kg)   09/17/20 146 lb 4.8 oz (66.4 kg)   09/03/20 146 lb (66.2 kg)       HPI  Ariane Reich is a pleasant 35 y.o.  female who presents today for abdominal pain, acid reflux and bloating. She has a past medical history of anesthesia, anxiety, arthritis, asthma, bronchitis, chronic back pain, cracked tooth, depression, heart murmur, heartburn, history of blood transfusion, [place] (hard of hearing), irritable bowel disease, migraines, panic attacks, pneumonia, shortness of breath on exertion, teeth missing, urinary, incontinence, stress female, uses contact lenses, and wears glasses. She recalls smoking 1PPD of cigarettes. She recently stopping using alcohol. She drinks coffee with sugar two to three cups and a gallon of sweet tea per day. She has been on Norco for chronic back pain that was started in May and takes twice a day. Her last EGD was done by Dr. Milad Real in 2016 with per patient record acid induced esophagitis, and gastritis. Her abdominal bloating and epigastric pain has been off and on for the last five years. She mentioned in the last three weeks having more abdominal swelling with 5/10 pressure like discomfort in epigastric/mid abdomen. Eating makes the bloating worse. Her appetite is poor with early satiety. Her weight is stable. No nausea or vomiting. She has intermittent heartburn. She stopped taking Prilosec a year ago. No nocturnal awakenings with acid reflux. No dysphaga or pain with swallowing. No excess belching or flatulence. Her typical bowel pattern is daily with soft brown formed stools. She takes a stool softener daily. No diarrhea or constipation. No blood in her stools or black tarry stools. She was adopted and uncertain of her family history for stomach or colon cancer.     ROS  Review of Systems Constitutional: Positive for fatigue. Negative for appetite change, chills, diaphoresis, fever and unexpected weight change. HENT: Positive for hearing loss. Negative for ear pain and tinnitus. Eyes: Positive for visual disturbance. Negative for photophobia and pain. Respiratory: Positive for cough. Negative for shortness of breath and wheezing. Cardiovascular: Negative for chest pain, palpitations and leg swelling. Gastrointestinal: Positive for abdominal pain. Negative for blood in stool, constipation, diarrhea, nausea and vomiting. Endocrine: Negative for cold intolerance, heat intolerance and polydipsia. Genitourinary: Negative for dysuria, frequency and urgency. Musculoskeletal: Positive for back pain, myalgias and neck pain. Skin: Negative for color change, pallor and rash. Allergic/Immunologic: Negative for environmental allergies and food allergies. Neurological: Positive for weakness and headaches. Negative for dizziness and seizures. Hematological: Bruises/bleeds easily. Psychiatric/Behavioral: Positive for dysphoric mood and sleep disturbance. Negative for suicidal ideas. The patient is nervous/anxious. Allergies  Allergies   Allergen Reactions    Lexapro [Escitalopram]      \"Shot Up My Blood Pressure And Pulse, Dizziness Heart Palpitations\"    Cymbalta [Duloxetine Hcl] Other (See Comments)     Can't remember    Zoloft [Sertraline Hcl] Nausea And Vomiting       Medications  Current Outpatient Medications   Medication Sig Dispense Refill    HYDROcodone-acetaminophen (NORCO) 5-325 MG per tablet Take 1 tablet by mouth 2 times daily as needed for Pain.  gabapentin (NEURONTIN) 100 MG capsule Take 300 mg by mouth 3 times daily. 1-2 tablets in the morning and 1 -3 tablets at bedtime.  albuterol (PROVENTIL HFA) 108 (90 BASE) MCG/ACT inhaler Inhale 2 puffs into the lungs every 4 hours as needed for Wheezing or Shortness of Breath for 30 days.  With spacer (and mask if indicated). Thanks. 1 Inhaler 1     No current facility-administered medications for this visit. Past medical history:   She has a past medical history of Anesthesia, Anxiety, Arthritis, Asthma, Bronchitis, Chronic back pain, Cracked tooth, Depression, Heart murmur, Heartburn, History of blood transfusion, [place] (hard of hearing), Irritable bowel disease, Migraines, Panic attacks, Pneumonia, Shortness of breath on exertion, Teeth missing, Urinary, incontinence, stress female, Uses contact lenses, and Wears glasses. Past surgical history:  She has a past surgical history that includes Dilation and curettage of uterus (6-20-12); Tubal ligation (2001); [place] tooth extraction; Dental surgery; Tonsillectomy and adenoidectomy (1992); laparoscopy (6-20-12); laparoscopy (11-12); HYSTERECTOMY VAGINAL (05/2013); Knee arthroscopy (Left); back surgery; hernia repair (9-14); hernia repair (05/15/2015); hernia repair (09/08/2020); and ventral hernia repair (Left, 9/8/2020). Social History:  She reports that she has been smoking cigarettes. She started smoking about 26 years ago. She has a 23.00 pack-year smoking history. She has never used smokeless tobacco. She reports current alcohol use. She reports that she does not use drugs. Family history:  Her family history includes Arthritis in her brother; Cancer in her father; Hearing Loss in her daughter; Kidney Disease in her son; Other in her father and son; Scoliosis in her daughter; Vision Loss in her son. She was adopted. Objective    Vitals:    11/02/20 1351   BP: 110/70   Temp: 98.1 °F (36.7 °C)        Physical exam    Physical Exam  Vitals signs reviewed. Constitutional:       Appearance: She is well-developed. HENT:      Head: Normocephalic and atraumatic. Eyes:      Conjunctiva/sclera: Conjunctivae normal.      Pupils: Pupils are equal, round, and reactive to light. Neck:      Musculoskeletal: Normal range of motion and neck supple. Thyroid: No thyromegaly. Vascular: No JVD. Trachea: No tracheal deviation. Cardiovascular:      Rate and Rhythm: Normal rate and regular rhythm. Heart sounds: Normal heart sounds. No murmur. No friction rub. No gallop. Pulmonary:      Effort: No respiratory distress. Breath sounds: Normal breath sounds. No wheezing or rales. Chest:      Chest wall: No tenderness. Abdominal:      General: Bowel sounds are normal. There is no distension. Palpations: Abdomen is soft. There is no mass. Tenderness: There is abdominal tenderness. There is no guarding or rebound. Musculoskeletal: Normal range of motion. Lymphadenopathy:      Cervical: No cervical adenopathy. Skin:     General: Skin is warm and dry. Neurological:      Mental Status: She is alert and oriented to person, place, and time.    Psychiatric:         Mood and Affect: Mood normal.         Admission on 09/08/2020, Discharged on 09/08/2020   Component Date Value Ref Range Status    WBC 09/08/2020 7.3  4.0 - 10.5 K/CU MM Final    RBC 09/08/2020 4.31  4.2 - 5.4 M/CU MM Final    Hemoglobin 09/08/2020 13.7  12.5 - 16.0 GM/DL Final    Hematocrit 09/08/2020 43.4  37 - 47 % Final    MCV 09/08/2020 100.7* 78 - 100 FL Final    MCH 09/08/2020 31.8* 27 - 31 PG Final    MCHC 09/08/2020 31.6* 32.0 - 36.0 % Final    RDW 09/08/2020 13.9  11.7 - 14.9 % Final    Platelets 87/07/3797 257  140 - 440 K/CU MM Final    MPV 09/08/2020 11.4* 7.5 - 11.1 FL Final    Sodium 09/08/2020 137  135 - 145 MMOL/L Final    Potassium 09/08/2020 4.1  3.5 - 5.1 MMOL/L Final    Chloride 09/08/2020 105  99 - 110 mMol/L Final    CO2 09/08/2020 24  21 - 32 MMOL/L Final    Anion Gap 09/08/2020 8  4 - 16 Final    BUN 09/08/2020 6  6 - 23 MG/DL Final    CREATININE 09/08/2020 0.8  0.6 - 1.1 MG/DL Final    Glucose 09/08/2020 95  70 - 99 MG/DL Final    Calcium 09/08/2020 8.9  8.3 - 10.6 MG/DL Final    GFR Non- 09/08/2020 >60  >60 mL/min/1.73m2 Final    GFR  09/08/2020 >60  >60 mL/min/1.73m2 Final       Assessment and Plan:  1. Will plan for a EGD with MAC anesthesia. The patient was informed of the risks and benefits of the procedure. 2.  Abdominal bloating most likely diet related, gastritis, acid reflux induced, multiple life stressors and/or small intestinal bacterial overgrowth. Nilesh Vero may cause delay in gastric emptying contributing to her symptoms. Will order abdominal ultrasound and HIDA scan to rule out gallbladder disease. Will order hydrogen breath test to rule out SIBO. The patient was instructed to avoid inflammatory foods like foods high in sugar and fat. Recommend weight loss, avoidance of NSAIDs and alcohol. Recommend EGD to rule out peptic ulcer disease. 3.  Epigastric pain most likely from abdominal bloating, gallbladder disease, scar adhesions from prior surgeries ?endometriosis or acid reflux induced gastritis. Recommend diet modifications, stress reduction, weight loss and avoidance of foods that trigger. Follow-up with Dr. Markell Garay as needed. 4.  GERD without dysphagia or odynophagia. Her last EGD was done over six years ago. Recommend EGD to rule out Thompson's esophagus. The patient was instructed to continue with anti-reflux measures and avoid foods that trigger. She would prefer to hold off on PPI use at this time. 5.  Further recommendations for follow-up will be determined after the EGD has been completed.

## 2020-11-04 ENCOUNTER — HOSPITAL ENCOUNTER (OUTPATIENT)
Dept: NUCLEAR MEDICINE | Age: 40
Discharge: HOME | End: 2020-11-04
Payer: COMMERCIAL

## 2020-11-04 ENCOUNTER — HOSPITAL ENCOUNTER (OUTPATIENT)
Dept: ULTRASOUND IMAGING | Age: 40
Discharge: HOME | End: 2020-11-04
Payer: COMMERCIAL

## 2020-11-04 VITALS — HEIGHT: 61 IN | BODY MASS INDEX: 28.51 KG/M2 | WEIGHT: 151 LBS

## 2020-11-04 PROCEDURE — 3430000000 HC RX DIAGNOSTIC RADIOPHARMACEUTICAL: Performed by: NURSE PRACTITIONER

## 2020-11-04 PROCEDURE — 78227 HEPATOBIL SYST IMAGE W/DRUG: CPT

## 2020-11-04 PROCEDURE — 76705 ECHO EXAM OF ABDOMEN: CPT

## 2020-11-04 PROCEDURE — 6360000002 HC RX W HCPCS: Performed by: NURSE PRACTITIONER

## 2020-11-04 PROCEDURE — 2580000003 HC RX 258: Performed by: NURSE PRACTITIONER

## 2020-11-04 PROCEDURE — A9537 TC99M MEBROFENIN: HCPCS | Performed by: NURSE PRACTITIONER

## 2020-11-04 RX ADMIN — Medication 5 MILLICURIE: at 09:30

## 2020-11-04 RX ADMIN — SODIUM CHLORIDE 1.4 MCG: 9 INJECTION, SOLUTION INTRAVENOUS at 10:50

## 2020-11-06 ENCOUNTER — TELEPHONE (OUTPATIENT)
Dept: GASTROENTEROLOGY | Age: 40
End: 2020-11-06

## 2020-11-09 ENCOUNTER — PREP FOR PROCEDURE (OUTPATIENT)
Dept: GASTROENTEROLOGY | Age: 40
End: 2020-11-09

## 2020-11-09 ENCOUNTER — TELEPHONE (OUTPATIENT)
Dept: GASTROENTEROLOGY | Age: 40
End: 2020-11-09

## 2020-11-09 DIAGNOSIS — Z01.818 PREOP TESTING: Primary | ICD-10-CM

## 2020-11-09 RX ORDER — SODIUM CHLORIDE, SODIUM LACTATE, POTASSIUM CHLORIDE, CALCIUM CHLORIDE 600; 310; 30; 20 MG/100ML; MG/100ML; MG/100ML; MG/100ML
INJECTION, SOLUTION INTRAVENOUS CONTINUOUS
Status: CANCELLED | OUTPATIENT
Start: 2020-11-09

## 2020-11-09 RX ORDER — SODIUM CHLORIDE 0.9 % (FLUSH) 0.9 %
10 SYRINGE (ML) INJECTION EVERY 12 HOURS SCHEDULED
Status: CANCELLED | OUTPATIENT
Start: 2020-11-09

## 2020-11-09 RX ORDER — SODIUM CHLORIDE 0.9 % (FLUSH) 0.9 %
10 SYRINGE (ML) INJECTION PRN
Status: CANCELLED | OUTPATIENT
Start: 2020-11-09

## 2020-11-24 ENCOUNTER — HOSPITAL ENCOUNTER (OUTPATIENT)
Dept: MRI IMAGING | Age: 40
Discharge: HOME | End: 2020-11-24
Payer: COMMERCIAL

## 2020-11-24 PROCEDURE — 74183 MRI ABD W/O CNTR FLWD CNTR: CPT

## 2020-11-24 PROCEDURE — 6360000004 HC RX CONTRAST MEDICATION: Performed by: NURSE PRACTITIONER

## 2020-11-24 PROCEDURE — A9579 GAD-BASE MR CONTRAST NOS,1ML: HCPCS | Performed by: NURSE PRACTITIONER

## 2020-11-24 RX ADMIN — GADOTERIDOL 15 ML: 279.3 INJECTION, SOLUTION INTRAVENOUS at 17:40

## 2020-11-30 ENCOUNTER — TELEPHONE (OUTPATIENT)
Dept: GASTROENTEROLOGY | Age: 40
End: 2020-11-30

## 2020-11-30 NOTE — TELEPHONE ENCOUNTER
Please call aerodynamics for hydrogen breath test results; patient sent in kit at beginning of November. Thank you.

## 2020-12-16 ENCOUNTER — HOSPITAL ENCOUNTER (OUTPATIENT)
Age: 40
Discharge: HOME | End: 2020-12-16
Payer: COMMERCIAL

## 2020-12-16 PROCEDURE — C9803 HOPD COVID-19 SPEC COLLECT: HCPCS

## 2020-12-16 PROCEDURE — U0002 COVID-19 LAB TEST NON-CDC: HCPCS

## 2020-12-17 LAB
SARS-COV-2: NOT DETECTED
SOURCE: NORMAL

## 2020-12-21 ENCOUNTER — ANESTHESIA EVENT (OUTPATIENT)
Dept: OPERATING ROOM | Age: 40
End: 2020-12-21
Payer: COMMERCIAL

## 2020-12-21 ASSESSMENT — LIFESTYLE VARIABLES: SMOKING_STATUS: 1

## 2020-12-21 ASSESSMENT — ENCOUNTER SYMPTOMS
SHORTNESS OF BREATH: 1
DYSPNEA ACTIVITY LEVEL: AFTER AMBULATING 1 FLIGHT OF STAIRS

## 2020-12-21 NOTE — ANESTHESIA PRE PROCEDURE
Department of Anesthesiology  Preprocedure Note       Name:  Lyle Coker   Age:  38 y.o.  :  1980                                          MRN:  [id number]         Date:  2020      Surgeon: Torie Glover):  Kenney Barbour MD    Procedure: Procedure(s):  EGD ESOPHAGOGASTRODUODENOSCOPY    Medications prior to admission:   Prior to Admission medications    Medication Sig Start Date End Date Taking? Authorizing Provider   HYDROcodone-acetaminophen (NORCO) 5-325 MG per tablet Take 1 tablet by mouth 2 times daily as needed for Pain. Historical Provider, MD   gabapentin (NEURONTIN) 100 MG capsule Take 300 mg by mouth 3 times daily. 1-2 tablets in the morning and 1 -3 tablets at bedtime. Historical Provider, MD   albuterol (PROVENTIL HFA) 108 (90 BASE) MCG/ACT inhaler Inhale 2 puffs into the lungs every 4 hours as needed for Wheezing or Shortness of Breath for 30 days. With spacer (and mask if indicated). Thanks. 13  Vinita Schmitt MD       Current medications:    Current Outpatient Medications   Medication Sig Dispense Refill    HYDROcodone-acetaminophen (NORCO) 5-325 MG per tablet Take 1 tablet by mouth 2 times daily as needed for Pain.  gabapentin (NEURONTIN) 100 MG capsule Take 300 mg by mouth 3 times daily. 1-2 tablets in the morning and 1 -3 tablets at bedtime.  albuterol (PROVENTIL HFA) 108 (90 BASE) MCG/ACT inhaler Inhale 2 puffs into the lungs every 4 hours as needed for Wheezing or Shortness of Breath for 30 days. With spacer (and mask if indicated). Thanks. 1 Inhaler 1     No current facility-administered medications for this visit. 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    Incisional hernia, without obstruction or gangrene K43.2       Past Medical History:        Diagnosis Date    Anesthesia     \"I Wake Up Crying\"    Anxiety     Arthritis     \"Knees\"    Asthma     No Pulmonologist At This Time- dx as teenager    Bronchitis Last Episode In 2013    Chronic back pain     Cracked tooth     Upper Left    Depression     Heart murmur     No Cardiologist At This Time\"born with a heart murmur\"    Heartburn     \"Frequent Heartburn\"    History of blood transfusion 1997    No Reaction To Blood Transfusion Received    [place] (hard of hearing)     \"Both Ears\"    Irritable bowel disease     Migraines Last Migraines 8-25-17    Panic attacks     Pneumonia Last Episode In 2013    Shortness of breath on exertion     Teeth missing     Upper And Lower    Urinary, incontinence, stress female     Uses contact lenses     Wears glasses        Past Surgical History:        Procedure Laterality Date    BACK SURGERY      \"C5= cervical fusion in June 2020\"at [place]    DENTAL SURGERY      Teeth Extracted In Past    DILATION AND CURETTAGE OF UTERUS  6-20-12    Laparoscopy, \"Cyst Right Ovary\"    HERNIA REPAIR  0-02    Umbilical Hernia Repair With Mesh    HERNIA REPAIR  05/15/2015    Robotic Laparoscopic Ventral Hernia Repair With Mesh    HERNIA REPAIR  09/08/2020    HYSTERECTOMY VAGINAL  05/2013    KNEE ARTHROSCOPY Left     chondroplasty open proximal patella    LAPAROSCOPY  6-20-12    D & C, \"Cyst Right Ovary\"    LAPAROSCOPY  11-12    TONSILLECTOMY AND ADENOIDECTOMY  1992    TUBAL LIGATION  2001    VENTRAL HERNIA REPAIR Left 9/8/2020    LEFT HERNIA INCISIONAL REPAIR performed by Olivier Chiu MD at [address] [place] [address] 4 [place] Teeth Extracted In Past       Social History:    Social History     Tobacco Use    Smoking status: Current Every Day Smoker     Packs/day: 1.00     Years: 23.00     Pack years: 23.00     Types: Cigarettes     Start date: 1994    Smokeless tobacco: Never Used   Substance Use Topics    Alcohol use: Yes     Comment: 'twice per week\"                                Ready to quit: Not Answered  Counseling given: Not Answered      Vital Signs (Current): There were no vitals filed for this visit. BP Readings from Last 3 Encounters:   11/02/20 110/70   09/17/20 124/76   09/08/20 (!) 130/98       NPO Status:                                                                                 BMI:   Wt Readings from Last 3 Encounters:   11/04/20 151 lb (68.5 kg)   11/02/20 151 lb 11.2 oz (68.8 kg)   09/17/20 146 lb 4.8 oz (66.4 kg)     There is no height or weight on file to calculate BMI.    CBC:   Lab Results   Component Value Date    WBC 7.3 09/08/2020    RBC 4.31 09/08/2020    HGB 13.7 09/08/2020    HCT 43.4 09/08/2020    .7 09/08/2020    RDW 13.9 09/08/2020     09/08/2020       CMP:   Lab Results   Component Value Date     09/08/2020    K 4.1 09/08/2020     09/08/2020    CO2 24 09/08/2020    BUN 6 09/08/2020    CREATININE 0.8 09/08/2020    GFRAA >60 09/08/2020    LABGLOM >60 09/08/2020    GLUCOSE 95 09/08/2020    PROT 6.2 06/22/2012    CALCIUM 8.9 09/08/2020    BILITOT 0.6 06/22/2012    ALKPHOS 53 06/22/2012    AST 18 06/22/2012    ALT 17 06/22/2012       POC Tests: No results for input(s): POCGLU, POCNA, POCK, POCCL, POCBUN, POCHEMO, POCHCT in the last 72 hours.     Coags: No results found for: PROTIME, INR, APTT    HCG (If Applicable): No results found for: PREGTESTUR, PREGSERUM, HCG, HCGQUANT     ABGs: No results found for: PHART, PO2ART, MDK4OIE, DRZ7FZQ, BEART, N1TXOZRO     Type & Screen (If Applicable):  No results found for: Deandre Martinez Drug/Infectious Status (If Applicable):  No results found for: HIV, HEPCAB    COVID-19 Screening (If Applicable):   Lab Results   Component Value Date    COVID19 NOT DETECTED 12/16/2020         Anesthesia Evaluation  Patient summary reviewed and Nursing notes reviewed history of anesthetic complications:   Airway: Mallampati: II  TM distance: >3 FB   Neck ROM: limited  Mouth opening: > = 3 FB Dental: normal exam         Pulmonary:   (+) shortness of breath:  asthma: current smoker                           Cardiovascular:  Exercise tolerance: good (>4 METS),   (+) WHITMORE: after ambulating 1 flight of stairs,     (-) past MI and CAD    ECG reviewed               Beta Blocker:  Not on Beta Blocker         Neuro/Psych:   (+) headaches: migraine headaches, depression/anxiety             GI/Hepatic/Renal:   (+) GERD: well controlled,           Endo/Other:              Pt had no PAT visit       Abdominal:           Vascular:                                          Anesthesia Plan      general     ASA 3     (Limited cervical motion. Due to neck fusion recently. GERD history but none today  Chart review only   covid - 12/16/2020)  Induction: intravenous. Plan discussed with ELAN.                 ENID Toledo - CRNA   12/21/2020

## 2020-12-22 ENCOUNTER — TELEPHONE (OUTPATIENT)
Dept: GASTROENTEROLOGY | Age: 40
End: 2020-12-22

## 2020-12-22 RX ORDER — CIPROFLOXACIN 500 MG/1
500 TABLET, FILM COATED ORAL 2 TIMES DAILY
Qty: 28 TABLET | Refills: 0 | Status: SHIPPED | OUTPATIENT
Start: 2020-12-22 | End: 2021-01-05

## 2020-12-22 NOTE — PROGRESS NOTES
Spoke with pt. Via telephone. Pt. will arrive at the ER [facility] 12[facility]23[facility]2020. Pt.informed that they may have no visitors come with them. They must be free of covid symptoms and must wear a mask upon entering the hospital.  If they do not have a mask, one will be given to them at the . The masks must be worn the whole time they are in the building. Pt. Will be NPO after MN tonight, including no gum, candy, or nicotine products. Pt. will take no medications the morning of the procedure. If the patient uses inhalers or cpap, they will bring them with them to the hospital.  Pt. will shower with soap and water and will not use any lotions, creams, ointments on their skin. Pt. will wear no jewelry or metal. Covid-19 test was completed on 12/16/2020  and results are negative. Pt. Has been self-quarantining since their Covid-19 testing. Pt. Has had no cough, sore throat, fever, or any other unusual s/s that the physician should be made aware of before surgery. Pt. Had no further questions and/or concerns at this time. SDS phone number was given should any further questions arise. [phone].

## 2020-12-22 NOTE — TELEPHONE ENCOUNTER
Patient called and left message notifying her that her breath test shows bacterial overgrowth is suspected will order Cipro 500 mg take twice daily for 14 days for treatment.

## 2020-12-22 NOTE — TELEPHONE ENCOUNTER
SPOKE TO Dawit Lima REGARDING SURGERY (EGD) SCHEDULED @ [facility].  NOTIFIED OF DATES LAST MON, TIMES AND LOCATION    PAT - PHONE  COVID - 12/16/20 8-12  SURGERY - 12/23  P/O - 1/4/20 @ 10:15    NPO AFTER MIDNIGHT  HOLD BLOOD THINNERS - N/A   SENT

## 2020-12-23 ENCOUNTER — HOSPITAL ENCOUNTER (OUTPATIENT)
Age: 40
Setting detail: OUTPATIENT SURGERY
Discharge: HOME | End: 2020-12-23
Attending: SURGERY | Admitting: SURGERY
Payer: COMMERCIAL

## 2020-12-23 ENCOUNTER — ANESTHESIA (OUTPATIENT)
Dept: OPERATING ROOM | Age: 40
End: 2020-12-23
Payer: COMMERCIAL

## 2020-12-23 VITALS — OXYGEN SATURATION: 97 % | DIASTOLIC BLOOD PRESSURE: 80 MMHG | SYSTOLIC BLOOD PRESSURE: 115 MMHG

## 2020-12-23 VITALS
OXYGEN SATURATION: 100 % | DIASTOLIC BLOOD PRESSURE: 85 MMHG | HEART RATE: 78 BPM | TEMPERATURE: 97.6 F | SYSTOLIC BLOOD PRESSURE: 125 MMHG | RESPIRATION RATE: 16 BRPM

## 2020-12-23 PROCEDURE — 2580000003 HC RX 258: Performed by: NURSE PRACTITIONER

## 2020-12-23 PROCEDURE — 7100000001 HC PACU RECOVERY - ADDTL 15 MIN: Performed by: SURGERY

## 2020-12-23 PROCEDURE — 6360000002 HC RX W HCPCS: Performed by: NURSE ANESTHETIST, CERTIFIED REGISTERED

## 2020-12-23 PROCEDURE — 43239 EGD BIOPSY SINGLE/MULTIPLE: CPT | Performed by: SURGERY

## 2020-12-23 PROCEDURE — 7100000010 HC PHASE II RECOVERY - FIRST 15 MIN: Performed by: SURGERY

## 2020-12-23 PROCEDURE — 7100000000 HC PACU RECOVERY - FIRST 15 MIN: Performed by: SURGERY

## 2020-12-23 PROCEDURE — 3700000001 HC ADD 15 MINUTES (ANESTHESIA): Performed by: SURGERY

## 2020-12-23 PROCEDURE — 2500000003 HC RX 250 WO HCPCS: Performed by: NURSE ANESTHETIST, CERTIFIED REGISTERED

## 2020-12-23 PROCEDURE — 3700000000 HC ANESTHESIA ATTENDED CARE: Performed by: SURGERY

## 2020-12-23 PROCEDURE — 7100000011 HC PHASE II RECOVERY - ADDTL 15 MIN: Performed by: SURGERY

## 2020-12-23 PROCEDURE — 3609012400 HC EGD TRANSORAL BIOPSY SINGLE/MULTIPLE: Performed by: SURGERY

## 2020-12-23 PROCEDURE — 2709999900 HC NON-CHARGEABLE SUPPLY: Performed by: SURGERY

## 2020-12-23 PROCEDURE — 88305 TISSUE EXAM BY PATHOLOGIST: CPT | Performed by: PATHOLOGY

## 2020-12-23 PROCEDURE — 88342 IMHCHEM/IMCYTCHM 1ST ANTB: CPT | Performed by: PATHOLOGY

## 2020-12-23 RX ORDER — PROPOFOL 10 MG/ML
INJECTION, EMULSION INTRAVENOUS PRN
Status: DISCONTINUED | OUTPATIENT
Start: 2020-12-23 | End: 2020-12-23 | Stop reason: SDUPTHER

## 2020-12-23 RX ORDER — LIDOCAINE HYDROCHLORIDE 20 MG/ML
INJECTION, SOLUTION EPIDURAL; INFILTRATION; INTRACAUDAL; PERINEURAL PRN
Status: DISCONTINUED | OUTPATIENT
Start: 2020-12-23 | End: 2020-12-23 | Stop reason: SDUPTHER

## 2020-12-23 RX ORDER — SODIUM CHLORIDE 0.9 % (FLUSH) 0.9 %
10 SYRINGE (ML) INJECTION EVERY 12 HOURS SCHEDULED
Status: DISCONTINUED | OUTPATIENT
Start: 2020-12-23 | End: 2020-12-23 | Stop reason: HOSPADM

## 2020-12-23 RX ORDER — SODIUM CHLORIDE, SODIUM LACTATE, POTASSIUM CHLORIDE, CALCIUM CHLORIDE 600; 310; 30; 20 MG/100ML; MG/100ML; MG/100ML; MG/100ML
500 INJECTION, SOLUTION INTRAVENOUS CONTINUOUS
Status: DISCONTINUED | OUTPATIENT
Start: 2020-12-23 | End: 2020-12-23 | Stop reason: HOSPADM

## 2020-12-23 RX ORDER — SODIUM CHLORIDE 0.9 % (FLUSH) 0.9 %
10 SYRINGE (ML) INJECTION PRN
Status: DISCONTINUED | OUTPATIENT
Start: 2020-12-23 | End: 2020-12-23 | Stop reason: HOSPADM

## 2020-12-23 RX ORDER — DEXAMETHASONE SODIUM PHOSPHATE 4 MG/ML
INJECTION, SOLUTION INTRA-ARTICULAR; INTRALESIONAL; INTRAMUSCULAR; INTRAVENOUS; SOFT TISSUE PRN
Status: DISCONTINUED | OUTPATIENT
Start: 2020-12-23 | End: 2020-12-23 | Stop reason: SDUPTHER

## 2020-12-23 RX ADMIN — PROPOFOL 100 MG: 10 INJECTION, EMULSION INTRAVENOUS at 12:19

## 2020-12-23 RX ADMIN — PROPOFOL 50 MG: 10 INJECTION, EMULSION INTRAVENOUS at 12:32

## 2020-12-23 RX ADMIN — PROPOFOL 100 MG: 10 INJECTION, EMULSION INTRAVENOUS at 12:27

## 2020-12-23 RX ADMIN — PROPOFOL 50 MG: 10 INJECTION, EMULSION INTRAVENOUS at 12:44

## 2020-12-23 RX ADMIN — PROPOFOL 50 MG: 10 INJECTION, EMULSION INTRAVENOUS at 12:24

## 2020-12-23 RX ADMIN — PROPOFOL 50 MG: 10 INJECTION, EMULSION INTRAVENOUS at 12:21

## 2020-12-23 RX ADMIN — PROPOFOL 50 MG: 10 INJECTION, EMULSION INTRAVENOUS at 12:36

## 2020-12-23 RX ADMIN — DEXAMETHASONE SODIUM PHOSPHATE 12 MG: 4 INJECTION, SOLUTION INTRAMUSCULAR; INTRAVENOUS at 12:36

## 2020-12-23 RX ADMIN — LIDOCAINE HYDROCHLORIDE 100 MG: 20 INJECTION, SOLUTION EPIDURAL; INFILTRATION; INTRACAUDAL; PERINEURAL at 12:19

## 2020-12-23 RX ADMIN — SODIUM CHLORIDE, PRESERVATIVE FREE 10 ML: 5 INJECTION INTRAVENOUS at 08:53

## 2020-12-23 RX ADMIN — SODIUM CHLORIDE, POTASSIUM CHLORIDE, SODIUM LACTATE AND CALCIUM CHLORIDE 1000 ML: 600; 310; 30; 20 INJECTION, SOLUTION INTRAVENOUS at 08:53

## 2020-12-23 RX ADMIN — PROPOFOL 50 MG: 10 INJECTION, EMULSION INTRAVENOUS at 12:40

## 2020-12-23 ASSESSMENT — ENCOUNTER SYMPTOMS
ANAL BLEEDING: 0
CONSTIPATION: 0
BACK PAIN: 0
SHORTNESS OF BREATH: 1
DYSPNEA ACTIVITY LEVEL: AFTER AMBULATING 1 FLIGHT OF STAIRS
PHOTOPHOBIA: 0
EYE ITCHING: 0
SORE THROAT: 0
EYE REDNESS: 0
STRIDOR: 0
ABDOMINAL PAIN: 1
COLOR CHANGE: 0
CHOKING: 0
APNEA: 0
RECTAL PAIN: 0

## 2020-12-23 ASSESSMENT — PAIN SCALES - GENERAL
PAINLEVEL_OUTOF10: 0
PAINLEVEL_OUTOF10: 0

## 2020-12-23 ASSESSMENT — PAIN - FUNCTIONAL ASSESSMENT: PAIN_FUNCTIONAL_ASSESSMENT: 0-10

## 2020-12-23 ASSESSMENT — LIFESTYLE VARIABLES: SMOKING_STATUS: 1

## 2020-12-23 NOTE — PROGRESS NOTES
Pt. Arrived in PACU via cart from endo. Simple mask at 6L of oxygen applied, attached to monitor. VS stable. Pt. Awake and alert. Denies pain or nausea. IV infusing without difficulty. Bedside report received from Collette Fonseca ,[facility] and Isma Astorga CRNA. Will continue to monitor via bedside.

## 2020-12-23 NOTE — PROGRESS NOTES
Pt. Is awake, alert, and oriented x 4. On room air. Vs stable. Denies pain or  Nausea. Pt. Up to use bathroom and get washed up. Gait is steady. Pt. Wants to go ahead and get dressed at this time. I did talk to pt.'s daughter-in-law and updated her on a discharge time. Call light in reach.

## 2020-12-23 NOTE — H&P
Evelyn Monson MD      General Surgery       Subjective:     Patient is a 38 y.o.  female scheduled for  EGD. Indications for procedure are epigastric abd pain.       Discussed Blood/Blood Products: yes    Patient Active Problem List    Diagnosis Date Noted    Incisional hernia, without obstruction or gangrene     Patellar instability of left knee     Degeneration, articular cartilage, patella     Abdominal distension 47/62/6940    Umbilical hernia 74/94/6774    Umbilical hernia, incarcerated 08/26/2014    Pelvic pain 08/26/2014     Past Medical History:   Diagnosis Date    Anesthesia     \"I Wake Up Crying\"    Anxiety     Arthritis     \"Knees\"    Asthma     No Pulmonologist At This Time- dx as teenager    Bronchitis Last Episode In 2013    Chronic back pain     Cracked tooth     Upper Left    Depression     Heart murmur     No Cardiologist At This Time\"born with a heart murmur\"    Heartburn     \"Frequent Heartburn\"    History of blood transfusion 1997    No Reaction To Blood Transfusion Received    [place] (hard of hearing)     \"Both Ears\"    Irritable bowel disease     Migraines Last Migraines 8-25-17    Panic attacks     Pneumonia Last Episode In 2013    Shortness of breath on exertion     Teeth missing     Upper And Lower    Urinary, incontinence, stress female     Uses contact lenses     Wears glasses       Past Surgical History:   Procedure Laterality Date    BACK SURGERY      \"C5= cervical fusion in June 2020\"at [place]    DENTAL SURGERY      Teeth Extracted In Past    DILATION AND CURETTAGE OF UTERUS  6-20-12    Laparoscopy, \"Cyst Right Ovary\"    HERNIA REPAIR  8-76    Umbilical Hernia Repair With Mesh    HERNIA REPAIR  05/15/2015    Robotic Laparoscopic Ventral Hernia Repair With Mesh    HERNIA REPAIR  09/08/2020    HYSTERECTOMY VAGINAL  05/2013    KNEE ARTHROSCOPY Left     chondroplasty open proximal patella    LAPAROSCOPY  6-20-12    D & C, \"Cyst Right Ovary\"    LAPAROSCOPY  11-12    TONSILLECTOMY AND ADENOIDECTOMY  1992    TUBAL LIGATION  2001    VENTRAL HERNIA REPAIR Left 9/8/2020    LEFT HERNIA INCISIONAL REPAIR performed by Dandy Bustamante MD at [address]      4 [place] Teeth Extracted In Past      Prior to Admission medications    Medication Sig Start Date End Date Taking? Authorizing Provider   HYDROcodone-acetaminophen (NORCO) 5-325 MG per tablet Take 1 tablet by mouth 2 times daily as needed for Pain. Yes Historical Provider, MD   gabapentin (NEURONTIN) 100 MG capsule Take 300 mg by mouth 3 times daily. 1-2 tablets in the morning and 1 -3 tablets at bedtime. Yes Historical Provider, MD   ciprofloxacin (CIPRO) 500 MG tablet Take 1 tablet by mouth 2 times daily for 14 days 12/22/20 1/5/21  ENID Cadet CNP   albuterol (PROVENTIL HFA) 108 (90 BASE) MCG/ACT inhaler Inhale 2 puffs into the lungs every 4 hours as needed for Wheezing or Shortness of Breath for 30 days. With spacer (and mask if indicated). Thanks.  11/7/13 9/2/20  Fuentes Barton MD     Allergies   Allergen Reactions    Lexapro [Escitalopram]      \"Shot Up My Blood Pressure And Pulse, Dizziness Heart Palpitations\"    Cymbalta [Duloxetine Hcl] Other (See Comments)     Can't remember    Zoloft [Sertraline Hcl] Nausea And Vomiting      Social History     Tobacco Use    Smoking status: Current Every Day Smoker     Packs/day: 1.00     Years: 23.00     Pack years: 23.00     Types: Cigarettes     Start date: 1994    Smokeless tobacco: Never Used   Substance Use Topics    Alcohol use: Yes     Comment: 'twice per week\"      Family History   Adopted: Yes   Problem Relation Age of Onset    Cancer Father         Melanoma    Other Father         Diverticulitis    Arthritis Brother     Hearing Loss Daughter     Scoliosis Daughter     Other Son         \"Massive Migraines\"    Vision Loss Son         Wears Glasses    Kidney Disease Son \"Stage One\"          Review of Systems  Review of Systems   Constitutional: Negative for chills and fever. HENT: Negative for ear pain, mouth sores, sore throat and tinnitus. Eyes: Negative for photophobia, redness and itching. Respiratory: Negative for apnea, choking and stridor. Cardiovascular: Negative for chest pain and palpitations. Gastrointestinal: Positive for abdominal pain. Negative for anal bleeding, constipation and rectal pain. Endocrine: Negative for polydipsia. Genitourinary: Negative for enuresis, flank pain and hematuria. Musculoskeletal: Negative for back pain, joint swelling and myalgias. Skin: Negative for color change and pallor. Allergic/Immunologic: Negative for environmental allergies. Neurological: Negative for syncope and speech difficulty. Psychiatric/Behavioral: Negative for confusion and hallucinations. Objective:     Patient Vitals for the past 8 hrs:   BP Temp Temp src Pulse Resp SpO2   12/23/20 0840 105/73 97.3 °F (36.3 °C) Temporal 77 16 96 %     Physical Exam  Constitutional:       Appearance: She is well-developed. HENT:      Head: Normocephalic. Eyes:      Pupils: Pupils are equal, round, and reactive to light. Neck:      Musculoskeletal: Normal range of motion and neck supple. Cardiovascular:      Rate and Rhythm: Normal rate. Pulmonary:      Effort: Pulmonary effort is normal.   Abdominal:      General: There is no distension. Palpations: Abdomen is soft. There is no mass. Tenderness: There is no abdominal tenderness. There is no guarding or rebound. Musculoskeletal: Normal range of motion. Skin:     General: Skin is warm. Neurological:      Mental Status: She is alert and oriented to person, place, and time.          Data Review  CBC:   Lab Results   Component Value Date    WBC 7.3 09/08/2020    RBC 4.31 09/08/2020     BMP:   Lab Results   Component Value Date    GLUCOSE 95 09/08/2020    CO2 24 09/08/2020    BUN 6 09/08/2020    CREATININE 0.8 09/08/2020    CALCIUM 8.9 09/08/2020       Assessment:     Epigastric abd pain    Plan:      Will proceed with EGD    Steven Oquendo MD

## 2020-12-23 NOTE — ANESTHESIA POSTPROCEDURE EVALUATION
Department of Anesthesiology  Postprocedure Note    Patient: Deedee Galvan  MRN: [id number]  YOB: 1980  Date of evaluation: 12/23/2020  Time:  1:21 PM     Procedure Summary     Date: 12/23/20 Room / Location: [address] [place] [address] 01 / [facility]    Anesthesia Start: 1214 Anesthesia Stop: [phone]    Procedure: EGD BIOPSY (N/A ) Diagnosis: (EPIGASTRIC PAIN)    Surgeons: Livia Monson MD Responsible Provider: ENID Patel CRNA    Anesthesia Type: MAC, TIVA ASA Status: 3          Anesthesia Type: MAC, TIVA    Antonio Phase I: Antonio Score: 9    Antonio Phase II:      Last vitals: Reviewed and per EMR flowsheets. Anesthesia Post Evaluation    Patient location during evaluation: PACU  Patient participation: complete - patient participated  Level of consciousness: awake and alert  Pain score: 0  Airway patency: patent  Nausea & Vomiting: no vomiting and no nausea  Complications: no  Cardiovascular status: blood pressure returned to baseline and hemodynamically stable  Respiratory status: acceptable, spontaneous ventilation, nonlabored ventilation and face mask  Hydration status: stable  Comments: Patient states she is breathing well and without difficulty. Only slight wheeze to right lower lobe. All other lung fields clear.

## 2020-12-23 NOTE — ANESTHESIA PRE PROCEDURE
Department of Anesthesiology  Preprocedure Note       Name:  Ahmet Downs   Age:  38 y.o.  :  1980                                          MRN:  [id number]         Date:  2020      Surgeon: Javy Taylor):  Kate Muniz MD    Procedure: Procedure(s):  EGD ESOPHAGOGASTRODUODENOSCOPY    Medications prior to admission:   Prior to Admission medications    Medication Sig Start Date End Date Taking? Authorizing Provider   ciprofloxacin (CIPRO) 500 MG tablet Take 1 tablet by mouth 2 times daily for 14 days 20  Uvaldo Matamoros APRN - CNP   HYDROcodone-acetaminophen (NORCO) 5-325 MG per tablet Take 1 tablet by mouth 2 times daily as needed for Pain. Historical Provider, MD   gabapentin (NEURONTIN) 100 MG capsule Take 300 mg by mouth 3 times daily. 1-2 tablets in the morning and 1 -3 tablets at bedtime. Historical Provider, MD   albuterol (PROVENTIL HFA) 108 (90 BASE) MCG/ACT inhaler Inhale 2 puffs into the lungs every 4 hours as needed for Wheezing or Shortness of Breath for 30 days. With spacer (and mask if indicated). Thanks. 13  Leanna Almodovar MD       Current medications:    No current facility-administered medications for this visit. No current outpatient medications on file. Facility-Administered Medications Ordered in Other Visits   Medication Dose Route Frequency Provider Last Rate Last Admin    lactated ringers infusion 500 mL  500 mL Intravenous Continuous Uvaldo Matamoros APRN - CNP 75 mL/hr at 20 0853 1,000 mL at 20 0853    sodium chloride flush 0.9 % injection 10 mL  10 mL Intravenous 2 times per day Uvaldo Matamoros APRN - CNP        sodium chloride flush 0.9 % injection 10 mL  10 mL Intravenous PRN Uvaldo Matamoros APRN - CNP   10 mL at 20 9954       Allergies:     Allergies   Allergen Reactions    Lexapro [Escitalopram]      \"Shot Up My Blood Pressure And Pulse, Dizziness Heart Palpitations\"  Cymbalta [Duloxetine Hcl] Other (See Comments)     Can't remember    Zoloft [Sertraline Hcl] Nausea And Vomiting       Problem List:    Patient Active Problem List   Diagnosis Code    Umbilical hernia, incarcerated K42.0    Pelvic pain B96.2    Umbilical hernia F42.9    Abdominal distension R14.0    Patellar instability of left knee M25.362    Degeneration, articular cartilage, patella M22.40    Incisional hernia, without obstruction or gangrene K43.2       Past Medical History:        Diagnosis Date    Anesthesia     \"I Wake Up Crying\"    Anxiety     Arthritis     \"Knees\"    Asthma     No Pulmonologist At This Time- dx as teenager    Bronchitis Last Episode In 2013    Chronic back pain     Cracked tooth     Upper Left    Depression     Heart murmur     No Cardiologist At This Time\"born with a heart murmur\"    Heartburn     \"Frequent Heartburn\"    History of blood transfusion 1997    No Reaction To Blood Transfusion Received    [place] (hard of hearing)     \"Both Ears\"    Irritable bowel disease     Migraines Last Migraines 8-25-17    Panic attacks     Pneumonia Last Episode In 2013    Shortness of breath on exertion     Teeth missing     Upper And Lower    Urinary, incontinence, stress female     Uses contact lenses     Wears glasses        Past Surgical History:        Procedure Laterality Date    BACK SURGERY      \"C5= cervical fusion in June 2020\"at [place]    DENTAL SURGERY      Teeth Extracted In Past    DILATION AND CURETTAGE OF UTERUS  6-20-12    Laparoscopy, \"Cyst Right Ovary\"    HERNIA REPAIR  0-03    Umbilical Hernia Repair With Mesh    HERNIA REPAIR  05/15/2015    Robotic Laparoscopic Ventral Hernia Repair With Mesh    HERNIA REPAIR  09/08/2020    HYSTERECTOMY VAGINAL  05/2013    KNEE ARTHROSCOPY Left     chondroplasty open proximal patella    LAPAROSCOPY  6-20-12    D & C, \"Cyst Right Ovary\"    LAPAROSCOPY  11-12   [address] [place] [address]  TUBAL LIGATION  2001    VENTRAL HERNIA REPAIR Left 9/8/2020    LEFT HERNIA INCISIONAL REPAIR performed by Den Collins MD at [address] EXTRACTION      4 [place] Teeth Extracted In Past       Social History:    Social History     Tobacco Use    Smoking status: Current Every Day Smoker     Packs/day: 1.00     Years: 23.00     Pack years: 23.00     Types: Cigarettes     Start date: 1994    Smokeless tobacco: Never Used   Substance Use Topics    Alcohol use: Yes     Comment: 'twice per week\"                                Ready to quit: Not Answered  Counseling given: Not Answered      Vital Signs (Current): There were no vitals filed for this visit. BP Readings from Last 3 Encounters:   12/23/20 105/73   11/02/20 110/70   09/17/20 124/76       NPO Status:                                                                                 BMI:   Wt Readings from Last 3 Encounters:   11/04/20 151 lb (68.5 kg)   11/02/20 151 lb 11.2 oz (68.8 kg)   09/17/20 146 lb 4.8 oz (66.4 kg)     There is no height or weight on file to calculate BMI.    CBC:   Lab Results   Component Value Date    WBC 7.3 09/08/2020    RBC 4.31 09/08/2020    HGB 13.7 09/08/2020    HCT 43.4 09/08/2020    .7 09/08/2020    RDW 13.9 09/08/2020     09/08/2020       CMP:   Lab Results   Component Value Date     09/08/2020    K 4.1 09/08/2020     09/08/2020    CO2 24 09/08/2020    BUN 6 09/08/2020    CREATININE 0.8 09/08/2020    GFRAA >60 09/08/2020    LABGLOM >60 09/08/2020    GLUCOSE 95 09/08/2020    PROT 6.2 06/22/2012    CALCIUM 8.9 09/08/2020    BILITOT 0.6 06/22/2012    ALKPHOS 53 06/22/2012    AST 18 06/22/2012    ALT 17 06/22/2012       POC Tests: No results for input(s): POCGLU, POCNA, POCK, POCCL, POCBUN, POCHEMO, POCHCT in the last 72 hours.     Coags: No results found for: PROTIME, INR, APTT

## 2020-12-28 ENCOUNTER — OFFICE VISIT (OUTPATIENT)
Dept: ORTHOPEDIC SURGERY | Age: 40
End: 2020-12-28
Payer: COMMERCIAL

## 2020-12-28 VITALS — HEIGHT: 61 IN | WEIGHT: 150 LBS | BODY MASS INDEX: 28.32 KG/M2 | HEART RATE: 86 BPM | OXYGEN SATURATION: 98 %

## 2020-12-28 PROCEDURE — 99202 OFFICE O/P NEW SF 15 MIN: CPT | Performed by: PHYSICIAN ASSISTANT

## 2020-12-28 ASSESSMENT — ENCOUNTER SYMPTOMS
EYES NEGATIVE: 1
RESPIRATORY NEGATIVE: 1
GASTROINTESTINAL NEGATIVE: 1

## 2020-12-28 NOTE — PATIENT INSTRUCTIONS
Continue to weight bear as tolerated  Continue range of motion  Ice and elevate as needed  Tylenol or Motrin for pain  MRI ordered for right knee  Follow up with MRI results in 2 weeks

## 2020-12-28 NOTE — PROGRESS NOTES
Review of Systems   Constitutional: Negative. HENT: Negative. Eyes: Negative. Respiratory: Negative. Cardiovascular: Negative. Gastrointestinal: Negative. Genitourinary: Negative. Musculoskeletal: Positive for arthralgias, gait problem, joint swelling and myalgias. Skin: Negative. Psychiatric/Behavioral: Negative. HPI:  Keiry Uriarte is a 38 y.o. female who presents the office today with complaints of chronic right knee pain that has been going on for 2-3 years. She denies any particular knee injury. She states that both of her knees have had problems with the patella wanting to dislocate. She does not recall any recent patellar dislocation in this right knee but does have pain all along the patella worse with bending and squatting. She notices a popping and crunching noise within the kneecap. She had a surgery on her left knee to stabilize the patella which she states did not really help much. She rates her right knee pain today at a 7/10, aching and persistent. She does notice swelling within the knee as well over-the-counter medication as well as prescription pain medication does not seem to help the knee. She is not ready to consider a steroid injection but if the MRI does not show anything surgical she would consider it.       Past Medical History:   Diagnosis Date    Anesthesia     \"I Wake Up Crying\"    Anxiety     Arthritis     \"Knees\"    Asthma     No Pulmonologist At This Time- dx as teenager    Bronchitis Last Episode In 2013    Chronic back pain     Cracked tooth     Upper Left    Depression     Heart murmur     No Cardiologist At This Time\"born with a heart murmur\"    Heartburn     \"Frequent Heartburn\"    History of blood transfusion 1997    No Reaction To Blood Transfusion Received    [place] (hard of hearing)     \"Both Ears\"    Irritable bowel disease     Migraines Last Migraines 8-25-17    Panic attacks     Pneumonia Last Episode In 2013  Shortness of breath on exertion     Teeth missing     Upper And Lower    Urinary, incontinence, stress female     Uses contact lenses     Wears glasses        Past Surgical History:   Procedure Laterality Date    BACK SURGERY      \"C5= cervical fusion in June 2020\"at [place]    DENTAL SURGERY      Teeth Extracted In Past    DILATION AND CURETTAGE OF UTERUS  6-20-12    Laparoscopy, \"Cyst Right Ovary\"    ENDOSCOPY, COLON, DIAGNOSTIC  12/23/2020    WITH BIOPSY BY DR. Shannan Pham IN [place]    HERNIA REPAIR  1-68    Umbilical Hernia Repair With Mesh    HERNIA REPAIR  05/15/2015    Robotic Laparoscopic Ventral Hernia Repair With Mesh    HERNIA REPAIR  09/08/2020    HYSTERECTOMY VAGINAL  05/2013    KNEE ARTHROSCOPY Left     chondroplasty open proximal patella    LAPAROSCOPY  6-20-12    D & C, \"Cyst Right Ovary\"    LAPAROSCOPY  11-12    TONSILLECTOMY AND ADENOIDECTOMY  1992    TUBAL LIGATION  2001    UPPER GASTROINTESTINAL ENDOSCOPY N/A 12/23/2020    EGD BIOPSY performed by Seth Thompson MD at [address] Left 9/8/2020    LEFT HERNIA INCISIONAL REPAIR performed by Seth Thompson MD at [address] EXTRACTION      4 [place] Teeth Extracted In Past       Family History   Adopted: Yes   Problem Relation Age of Onset    Cancer Father         Melanoma    Other Father         Diverticulitis    Arthritis Brother     Hearing Loss Daughter     Scoliosis Daughter     Other Son         Derrek Galvez Migraines\"    Vision Loss Son         Wears Glasses    Kidney Disease Son         \"Stage One\"       Social History     Socioeconomic History    Marital status:      Spouse name: None    Number of children: None    Years of education: None    Highest education level: None   Occupational History    None   Social Needs    Financial resource strain: None    Food insecurity     Worry: None     Inability: None    Transportation needs     Medical: None     Non-medical: None Tobacco Use    Smoking status: Current Every Day Smoker     Packs/day: 1.00     Years: 23.00     Pack years: 23.00     Types: Cigarettes     Start date: 12    Smokeless tobacco: Never Used   Substance and Sexual Activity    Alcohol use: Yes     Comment: 'twice per week\"    Drug use: No    Sexual activity: Yes     Partners: Male   Lifestyle    Physical activity     Days per week: None     Minutes per session: None    Stress: None   Relationships    Social connections     Talks on phone: None     Gets together: None     Attends Yazdanism service: None     Active member of club or organization: None     Attends meetings of clubs or organizations: None     Relationship status: None    Intimate partner violence     Fear of current or ex partner: None     Emotionally abused: None     Physically abused: None     Forced sexual activity: None   Other Topics Concern    None   Social History Narrative    None       Current Outpatient Medications   Medication Sig Dispense Refill    ciprofloxacin (CIPRO) 500 MG tablet Take 1 tablet by mouth 2 times daily for 14 days 28 tablet 0    HYDROcodone-acetaminophen (NORCO) 5-325 MG per tablet Take 1 tablet by mouth 2 times daily as needed for Pain.  gabapentin (NEURONTIN) 100 MG capsule Take 300 mg by mouth 3 times daily. 1-2 tablets in the morning and 1 -3 tablets at bedtime.  albuterol (PROVENTIL HFA) 108 (90 BASE) MCG/ACT inhaler Inhale 2 puffs into the lungs every 4 hours as needed for Wheezing or Shortness of Breath for 30 days. With spacer (and mask if indicated). Thanks. 1 Inhaler 1     No current facility-administered medications for this visit.         Allergies   Allergen Reactions    Lexapro [Escitalopram]      \"Shot Up My Blood Pressure And Pulse, Dizziness Heart Palpitations\"    Cymbalta [Duloxetine Hcl] Other (See Comments)     Can't remember    Zoloft [Sertraline Hcl] Nausea And Vomiting       Review of Systems:  See above      Physical Exam: Plan:  Natural history and expected course discussed. Questions answered.   Patient Instructions   Continue to weight bear as tolerated  Continue range of motion  Ice and elevate as needed  Tylenol or Motrin for pain  MRI ordered for right knee  Follow up with MRI results in 2 weeks

## 2021-01-04 ENCOUNTER — OFFICE VISIT (OUTPATIENT)
Dept: GASTROENTEROLOGY | Age: 41
End: 2021-01-04
Payer: COMMERCIAL

## 2021-01-04 VITALS
HEART RATE: 79 BPM | BODY MASS INDEX: 29.19 KG/M2 | TEMPERATURE: 97.4 F | DIASTOLIC BLOOD PRESSURE: 86 MMHG | WEIGHT: 154.6 LBS | SYSTOLIC BLOOD PRESSURE: 116 MMHG | HEIGHT: 61 IN

## 2021-01-04 DIAGNOSIS — K21.9 GASTROESOPHAGEAL REFLUX DISEASE WITHOUT ESOPHAGITIS: ICD-10-CM

## 2021-01-04 DIAGNOSIS — K63.89 SMALL INTESTINAL BACTERIAL OVERGROWTH: ICD-10-CM

## 2021-01-04 DIAGNOSIS — R14.0 ABDOMINAL BLOATING: ICD-10-CM

## 2021-01-04 DIAGNOSIS — K29.50 CHRONIC GASTRITIS WITHOUT BLEEDING, UNSPECIFIED GASTRITIS TYPE: Primary | ICD-10-CM

## 2021-01-04 PROCEDURE — 99213 OFFICE O/P EST LOW 20 MIN: CPT | Performed by: NURSE PRACTITIONER

## 2021-01-04 RX ORDER — OMEPRAZOLE 20 MG/1
20 CAPSULE, DELAYED RELEASE ORAL DAILY
Qty: 30 CAPSULE | Refills: 3 | Status: SHIPPED | OUTPATIENT
Start: 2021-01-04 | End: 2021-04-05 | Stop reason: SDUPTHER

## 2021-01-04 RX ORDER — CYCLOBENZAPRINE HCL 10 MG
10 TABLET ORAL 2 TIMES DAILY PRN
COMMUNITY
Start: 2020-12-30 | End: 2021-06-28

## 2021-01-04 RX ORDER — ALUMINUM ZIRCONIUM OCTACHLOROHYDREX GLY 16 G/100G
1 GEL TOPICAL DAILY
Qty: 30 CAPSULE | Refills: 4 | Status: SHIPPED | OUTPATIENT
Start: 2021-01-04 | End: 2021-02-03

## 2021-01-04 RX ORDER — GABAPENTIN 300 MG/1
CAPSULE ORAL
COMMUNITY
Start: 2020-12-18

## 2021-01-04 ASSESSMENT — ENCOUNTER SYMPTOMS
BACK PAIN: 1
COUGH: 1
COLOR CHANGE: 0
WHEEZING: 0
DIARRHEA: 0
CONSTIPATION: 0
SHORTNESS OF BREATH: 0
EYE PAIN: 0
VOMITING: 0
BLOOD IN STOOL: 0
PHOTOPHOBIA: 0
ABDOMINAL PAIN: 0
NAUSEA: 0

## 2021-01-04 NOTE — PROGRESS NOTES
Ariane Fiore 38 y.o. female was seen by BOBBY Lyons on 01/04/21     Wt Readings from Last 3 Encounters:   01/04/21 154 lb 9.6 oz (70.1 kg)   12/28/20 150 lb (68 kg)   11/04/20 151 lb (68.5 kg)       HPI Ariane Alcaraz is a pleasant 38 y.o.  female who presents today for follow-up on EGD, acid reflux, abdominal bloating and small intestinal bacterial overgrowth. Her EGD showed normal esophagus, small hiatal hernia, and mild gastritis. Her stomach biopsies showed reactive gastropathy with no evidence of H. Pylori infection. Her appetite has improved. She has gained five pounds in last couple month due to eating sweets over the holiday. Her abdominal ultrasound done on 11-4-2020 showed no sonographic evidence of cholelithiasis or acute cholecystitis, increased echogenicity of the liver parenchyma due to fatty liver and 0.5 cm hyperechoic lesion in the right lobe of the liver. Her HIDA scan showed gallbladder ejection fraction is 80%. Her MRI of abdomen done on 11- showed the 5 mm sonographic right hepatic lobe lesion reflects a benign cavernous hemangioma. Her Aerodynamic breath test completed on 11- showed bacterial overgrowth is suspected. She was started on Cipro and is tolerating well for the treatment of small intestinal bacterial overgrowth. Her last dose of Cipro is scheduled for Sunday. She denies NSAID use. Intermittent alcohol use of shots of Cece Rom a couple times a month. She smokes 1PPD and has tried Chantix in the past to quit smoking but stopped taking due to increase in mood swings. Her epigastric pain has improved since our last visit. She denies current abdominal pain. She continues to have non-worsening abdominal bloating. No nausea or vomiting. She has acid reflux and intermittent heartburn. She stopped taking Prilosec a year ago. Nocturnal awakenings with acid reflux occur a couple times a week. No dysphagia or pain with swallowing. No excess belching or flatulence. Her typical bowel pattern is daily with soft brown formed stools. She takes a stool softener daily. No diarrhea or constipation. No blood in her stools or black tarry stools.   She was adopted and uncertain of her family history for stomach or colon cancer.       ROS  Review of Systems   Constitutional: Positive for fatigue. Negative for chills, diaphoresis and fever. HENT: Positive for hearing loss. Negative for ear pain and tinnitus. Eyes: Positive for visual disturbance. Negative for photophobia and pain. Respiratory: Positive for cough. Negative for shortness of breath and wheezing. Cardiovascular: Negative for chest pain, palpitations and leg swelling. Gastrointestinal: Negative for abdominal pain, blood in stool, constipation, diarrhea, nausea and vomiting. Endocrine: Negative for cold intolerance, heat intolerance and polydipsia. Genitourinary: Negative for dysuria, frequency and urgency. Musculoskeletal: Positive for back pain, myalgias and neck pain. Skin: Negative for color change, pallor and rash. Allergic/Immunologic: Negative for environmental allergies and food allergies. Neurological: Positive for headaches. Negative for dizziness, seizures and weakness. Hematological: Bruises/bleeds easily. Psychiatric/Behavioral: Positive for dysphoric mood and sleep disturbance. Negative for suicidal ideas. The patient is nervous/anxious.         Allergies  Allergies   Allergen Reactions    Lexapro [Escitalopram]      \"Shot Up My Blood Pressure And Pulse, Dizziness Heart Palpitations\"    Cymbalta [Duloxetine Hcl] Other (See Comments)     Can't remember    Zoloft [Sertraline Hcl] Nausea And Vomiting       Medications  Current Outpatient Medications   Medication Sig Dispense Refill    cyclobenzaprine (FLEXERIL) 10 MG tablet Take 10 mg by mouth 2 times daily as needed      gabapentin (NEURONTIN) 300 MG capsule TAKE 1 CAPSULE BY MOUTH THREE TIMES A DAY      ciprofloxacin (CIPRO) 500 MG tablet Take 1 tablet by mouth 2 times daily for 14 days 28 tablet 0  HYDROcodone-acetaminophen (NORCO) 5-325 MG per tablet Take 1 tablet by mouth 2 times daily as needed for Pain.  albuterol (PROVENTIL HFA) 108 (90 BASE) MCG/ACT inhaler Inhale 2 puffs into the lungs every 4 hours as needed for Wheezing or Shortness of Breath for 30 days. With spacer (and mask if indicated). Thanks. 1 Inhaler 1     No current facility-administered medications for this visit. Past medical history:   She has a past medical history of Anesthesia, Anxiety, Arthritis, Asthma, Bronchitis, Chronic back pain, Cracked tooth, Depression, Heart murmur, Heartburn, History of blood transfusion, [place] (hard of hearing), Irritable bowel disease, Migraines, Panic attacks, Pneumonia, Shortness of breath on exertion, Teeth missing, Urinary, incontinence, stress female, Uses contact lenses, and Wears glasses. Past surgical history:  She has a past surgical history that includes Dilation and curettage of uterus (6-20-12); Tubal ligation (2001); [place] tooth extraction; Dental surgery; Tonsillectomy and adenoidectomy (1992); laparoscopy (6-20-12); laparoscopy (11-12); HYSTERECTOMY VAGINAL (05/2013); Knee arthroscopy (Left); back surgery; hernia repair (9-14); hernia repair (05/15/2015); hernia repair (09/08/2020); ventral hernia repair (Left, 9/8/2020); Endoscopy, colon, diagnostic (12/23/2020); and Upper gastrointestinal endoscopy (N/A, 12/23/2020). Social History:  She reports that she has been smoking cigarettes. She started smoking about 27 years ago. She has a 23.00 pack-year smoking history. She has never used smokeless tobacco. She reports current alcohol use. She reports that she does not use drugs. Family history:  Her family history includes Arthritis in her brother; Cancer in her father; Hearing Loss in her daughter; Kidney Disease in her son; Other in her father and son; Scoliosis in her daughter; Vision Loss in her son. She was adopted.     Objective    Vitals:    01/04/21 1014 BP: 116/86   Pulse: 79   Temp: 97.4 °F (36.3 °C)        Physical exam    Physical Exam  Vitals signs reviewed. Constitutional:       General: She is not in acute distress. Appearance: Normal appearance. She is well-developed. She is not ill-appearing, toxic-appearing or diaphoretic. HENT:      Head: Normocephalic and atraumatic. Nose: Nose normal.      Mouth/Throat:      Mouth: Mucous membranes are moist.   Eyes:      Conjunctiva/sclera: Conjunctivae normal.      Pupils: Pupils are equal, round, and reactive to light. Neck:      Musculoskeletal: Normal range of motion and neck supple. Thyroid: No thyromegaly. Vascular: No JVD. Trachea: No tracheal deviation. Cardiovascular:      Rate and Rhythm: Normal rate and regular rhythm. Pulses: Normal pulses. Heart sounds: Normal heart sounds. No murmur. No friction rub. No gallop. Pulmonary:      Effort: Pulmonary effort is normal. No respiratory distress. Breath sounds: Normal breath sounds. No stridor. No wheezing, rhonchi or rales. Chest:      Chest wall: No tenderness. Abdominal:      General: Bowel sounds are normal. There is no distension. Palpations: Abdomen is soft. There is no mass. Tenderness: There is abdominal tenderness. There is no guarding or rebound. Hernia: No hernia is present. Musculoskeletal: Normal range of motion. Lymphadenopathy:      Cervical: No cervical adenopathy. Skin:     General: Skin is warm and dry. Neurological:      Mental Status: She is alert and oriented to person, place, and time. Psychiatric:         Mood and Affect: Mood normal.         Hospital Outpatient Visit on 12/16/2020   Component Date Value Ref Range Status    Source 12/16/2020 VIRAL SWAB   Final    SARS-CoV-2 12/16/2020 NOT DETECTED  NOT DETECTED Final    Comment: (NOTE)  This nucleic acid amplification test was developed and its  performance characteristics determined by [facility]. Nucleic acid amplification tests include PCR and TMA. This test has  not been FDA cleared or approved. This test has been authorized by  FDA under an Emergency Use Authorization (EUA). This test is only  authorized for the duration of time the declaration that  circumstances exist justifying the authorization of the emergency use  of in vitro diagnostic tests for detection of SARS-CoV-2 virus and/or  diagnosis of COVID-19 infection under section 564(b)(1) of the Act,  21 U. S.C. 942UVU-1(G) (1), unless the authorization is terminated or  revoked sooner. When diagnostic testing is negative, the possibility of a false  negative result should be considered in the context of a patient's  recent exposures and the presence of clinical signs and symptoms  consistent with COVID-19. An individual without symptoms of COVID-  19 and who is not shedding SARS-CoV-2 vi                           eulalia would expect to have a  negative (not detected) result in this assay. Performed At: [address] [id number]  Olga Moore MD AV:[id number]         Assessment and Plan:  1. Abdominal bloating most likely diet related, gastritis, acid reflux induced, multiple life stressors and small intestinal bacterial overgrowth. [address] [facility] may cause delay in gastric emptying contributing to her symptoms. Her abdominal ultrasound and HIDA scan revealed no evidence of gallbladder disease. Her hydrogen breath test revealed evidence of SIBO. The patient was instructed to avoid inflammatory foods like foods high in sugar and fat. Recommend weight loss, avoidance of NSAIDs and alcohol. Her  EGD showed small hiatal hernia, mild gastritis with no evidence of peptic ulcer disease or H. Pylori infection.

## 2021-01-10 ENCOUNTER — HOSPITAL ENCOUNTER (OUTPATIENT)
Dept: MRI IMAGING | Age: 41
Discharge: HOME | End: 2021-01-10
Payer: COMMERCIAL

## 2021-01-10 DIAGNOSIS — M25.561 CHRONIC PAIN OF RIGHT KNEE: ICD-10-CM

## 2021-01-10 DIAGNOSIS — G89.29 CHRONIC PAIN OF RIGHT KNEE: ICD-10-CM

## 2021-01-10 DIAGNOSIS — M25.461 KNEE EFFUSION, RIGHT: ICD-10-CM

## 2021-01-10 PROCEDURE — 73721 MRI JNT OF LWR EXTRE W/O DYE: CPT

## 2021-01-11 ENCOUNTER — OFFICE VISIT (OUTPATIENT)
Dept: ORTHOPEDIC SURGERY | Age: 41
End: 2021-01-11
Payer: COMMERCIAL

## 2021-01-11 ENCOUNTER — TELEPHONE (OUTPATIENT)
Dept: ORTHOPEDIC SURGERY | Age: 41
End: 2021-01-11

## 2021-01-11 VITALS
BODY MASS INDEX: 29.07 KG/M2 | HEART RATE: 72 BPM | WEIGHT: 154 LBS | HEIGHT: 61 IN | OXYGEN SATURATION: 98 % | RESPIRATION RATE: 16 BRPM

## 2021-01-11 DIAGNOSIS — M22.41 CHONDROMALACIA OF RIGHT PATELLA: Primary | ICD-10-CM

## 2021-01-11 PROCEDURE — 99212 OFFICE O/P EST SF 10 MIN: CPT | Performed by: PHYSICIAN ASSISTANT

## 2021-01-11 PROCEDURE — 20610 DRAIN/INJ JOINT/BURSA W/O US: CPT | Performed by: PHYSICIAN ASSISTANT

## 2021-01-11 ASSESSMENT — ENCOUNTER SYMPTOMS
EYES NEGATIVE: 1
GASTROINTESTINAL NEGATIVE: 1
RESPIRATORY NEGATIVE: 1

## 2021-01-11 NOTE — LETTER
[address] [facility]  [address] [facility]  Phone: [phone]  Fax: [phone]    Bell Meek        January 11, 2021     Patient: Nini Cortez   YOB: 1980   Date of Visit: 1/11/2021       To Whom it May Concern:    Tracy Verma was seen in my clinic on 1/11/2021. She may return to work on 01/12/2020. If you have any questions or concerns, please don't hesitate to call.     Sincerely,         Rohit Abrams PA-C

## 2021-01-11 NOTE — TELEPHONE ENCOUNTER
Derrell Henderson PA-C Reccommended not having anymore steroid/cortisone injections. Patient came into the office today thinking that she was having a allergic reaction to a cortisone injection that was given today Monday (01/11/2021) morning between the time of 8:45am to 9:15am. Patient denies SOB, chest pain. Patient says that her leg is very numb and patient is unable to weight bear with the right knee. Patient states that she is having muscle spasms posterior to the patella. Large amounts of swelling within 3 hours. Patient states that she has had steroids in the back before and noticed that she broken out into rash/hives she doesn't know if it was from the cortisone or the iodine. Patient was advise to put ice on the leg for 20-25 mins on and 30 mins off. Patient verbally states that was no. Per Charles Cruz. Do not use ice packs , use an actual bag of ice. Patient agreed and verbally understood. Told patient in the room and in the parking lot while I was helping the patient in the car and returning the wheelchair to the lobby. That if the steroid injection flare up swelling didn't subside then the patient needs to go to the ED. Wor note was given today for patient to remain off work until tomorrow (01/12/2020). Patient is to go home and ice, elevate and take anti-inflammatory.     BP: 137/84  Pulse: 103  PO2: 100  Temp: Oral 98.4

## 2021-01-11 NOTE — PROGRESS NOTES
I reviewed and agree with the portions of the HPI, review of systems, vital documentation and plan performed by my staff and have added/addended where appropriate. Review of Systems   Constitutional: Negative. HENT: Negative. Eyes: Negative. Respiratory: Negative. Cardiovascular: Negative. Gastrointestinal: Negative. Genitourinary: Negative. Musculoskeletal: Positive for arthralgias and myalgias. Skin: Negative. Negative for rash and wound. Neurological: Negative. Psychiatric/Behavioral: Negative. Constantine Quinonez is a 38 y.o. female that presents back to the office to go over MRI results of her right knee. Patient has pain in the patella and along the medial aspect of the knee. She states that her worst pain is when she goes up and down stairs, or when she goes to bend the knee past 90 degrees or when she squats down.           Past Medical History:   Diagnosis Date    Anesthesia     \"I Wake Up Crying\"    Anxiety     Arthritis     \"Knees\"    Asthma     No Pulmonologist At This Time- dx as teenager    Bronchitis Last Episode In 2013    Chronic back pain     Cracked tooth     Upper Left    Depression     Heart murmur     No Cardiologist At This Time\"born with a heart murmur\"    Heartburn     \"Frequent Heartburn\"    History of blood transfusion 1997    No Reaction To Blood Transfusion Received    [place] (hard of hearing)     \"Both Ears\"    Irritable bowel disease     Migraines Last Migraines 8-25-17    Panic attacks     Pneumonia Last Episode In 2013    Shortness of breath on exertion     Teeth missing     Upper And Lower    Urinary, incontinence, stress female     Uses contact lenses     Wears glasses        Past Surgical History:   Procedure Laterality Date    BACK SURGERY      \"C5= cervical fusion in June 2020\"at [facility] DENTAL SUR[facility]ERY      Teeth Extracted In Past    DILATION AND CURETTAGE OF UTERUS  6-20-12    Laparoscopy, \"Cyst Right Ovary\"  ENDOSCOPY, COLON, DIAGNOSTIC  12/23/2020    WITH BIOPSY BY DR. Bolivar Slider IN [place]    HERNIA REPAIR  2-58    Umbilical Hernia Repair With Mesh    HERNIA REPAIR  05/15/2015    Robotic Laparoscopic Ventral Hernia Repair With Mesh    HERNIA REPAIR  09/08/2020    HYSTERECTOMY VAGINAL  05/2013    KNEE ARTHROSCOPY Left     chondroplasty open proximal patella    LAPAROSCOPY  6-20-12    D & C, \"Cyst Right Ovary\"    LAPAROSCOPY  11-12    TONSILLECTOMY AND ADENOIDECTOMY  1992    TUBAL LIGATION  2001    UPPER GASTROINTESTINAL ENDOSCOPY N/A 12/23/2020    EGD BIOPSY performed by Freddy Carballo MD at [address] Left 9/8/2020    LEFT HERNIA INCISIONAL REPAIR performed by Freddy Carballo MD at [address] EXTRACTION      4 [place] Teeth Extracted In Past       Family History   Adopted: Yes   Problem Relation Age of Onset    Cancer Father         Melanoma    Other Father         Diverticulitis    Arthritis Brother     Hearing Loss Daughter     Scoliosis Daughter     Other Son         \"Massive Migraines\"    Vision Loss Son         Wears Glasses    Kidney Disease Son         \"Stage One\"       Social History     Socioeconomic History    Marital status:      Spouse name: None    Number of children: None    Years of education: None    Highest education level: None   Occupational History    None   Social Needs    Financial resource strain: None    Food insecurity     Worry: None     Inability: None    Transportation needs     Medical: None     Non-medical: None   Tobacco Use    Smoking status: Current Every Day Smoker     Packs/day: 1.00     Years: 23.00     Pack years: 23.00     Types: Cigarettes     Start date: 1994    Smokeless tobacco: Never Used   Substance and Sexual Activity    Alcohol use: Yes     Comment: 'twice per week\"    Drug use: No    Sexual activity: Yes     Partners: Male   Lifestyle    Physical activity     Days per week: None Minutes per session: None    Stress: None   Relationships    Social connections     Talks on phone: None     Gets together: None     Attends [employer] service: None     Active member of club or organization: None     Attends meetings of clubs or organizations: None     Relationship status: None    Intimate partner violence     Fear of current or ex partner: None     Emotionally abused: None     Physically abused: None     Forced sexual activity: None   Other Topics Concern    None   Social History Narrative    None       Current Outpatient Medications   Medication Sig Dispense Refill    cyclobenzaprine (FLEXERIL) 10 MG tablet Take 10 mg by mouth 2 times daily as needed      gabapentin (NEURONTIN) 300 MG capsule TAKE 1 CAPSULE BY MOUTH THREE TIMES A DAY      omeprazole (PRILOSEC) 20 MG delayed release capsule Take 1 capsule by mouth Daily 30 capsule 3    Probiotic Product (ACIDOPHILUS PROBIOTIC) CAPS capsule Take 1 capsule by mouth daily 30 capsule 4    HYDROcodone-acetaminophen (NORCO) 5-325 MG per tablet Take 1 tablet by mouth 2 times daily as needed for Pain.  albuterol (PROVENTIL HFA) 108 (90 BASE) MCG/ACT inhaler Inhale 2 puffs into the lungs every 4 hours as needed for Wheezing or Shortness of Breath for 30 days. With spacer (and mask if indicated). Thanks. 1 Inhaler 1     No current facility-administered medications for this visit.         Allergies   Allergen Reactions    Lexapro [Escitalopram]      \"Shot Up My Blood Pressure And Pulse, Dizziness Heart Palpitations\"    Cymbalta [Duloxetine Hcl] Other (See Comments)     Can't remember    Zoloft [Sertraline Hcl] Nausea And Vomiting       Review of Systems:  See above      Physical Exam:   Pulse 72   Resp 16   Ht 5' 1\" (1.549 m)   Wt 154 lb (69.9 kg)   LMP 05/29/2012   SpO2 98%   BMI 29.10 kg/m²        Gait is Normal. Gen/Psych:Examination reveals a pleasant individual in no acute distress. The patient is oriented to time, place and person. The patient's mood and affect are appropriate. Patient appears well nourished. Body habitus is overweight     Lymph:  no lymphedema in bilateral lower extremities     Skin intact in bilateral lower extremities with no ulcerations, lesions, rash, erythema. Vascular: There are no varicosities in bilateral lower extremities, sensation intact to light touch over bilateral lower extremities. Imaging studies- Right knee   Impression   Articular cartilage degeneration of the knee, most severely affecting the   patella where there is a focus of near full-thickness to full-thickness   articular cartilage degeneration seen along the central patellar eminence   with underlying subchondral degenerative signal intensity seen within the   patella.       Small knee joint effusion.       No discrete fluid-filled meniscal tear or imaging features of ligamentous   injury of the knee seen. Impression:    Diagnosis Orders   1. Chondromalacia of right patella             Plan:    The most likely impression, expected course, diagnostic and treatment options were discussed, will proceed with:  MRI was reviewed, decision for steroid injection in the right knee was made. Steroid injection given in the right knee today. Follow-up as needed and we could consider viscosupplementation injection in the right knee if needed    Right knee injection procedure note    Pre-procedure diagnosis:  Right knee DJD    Post-procedure diagnosis:  same    Procedure: The planned procedure/risks/benefits/alternatives were discussed with the patient. Risks include, but are not limited to, increased pain, drug reaction, infection, bleeding, lack of improvement, neurovascular injury, and increased blood sugar levels. The patient understood and all of their questions were answered. The right knee was prepped with alcohol, then a 22 gauge needle was advanced into the inferior-lateral joint without difficulty. The joint was then injected with 3 ml 1% lidocaine, 2ml of Kenalog (40 mg/ml). The injection site was cleansed with isopropyl alcohol and a band-aid was placed. Complications:  None, the patient tolerated the procedure well. Instructions: The patient was advised to rest the knee and decrease activity for the next 24 to 48 hours. May use prescription or OTC pain relievers as needed for any post-injection pain as well as ice.

## 2021-04-05 ENCOUNTER — OFFICE VISIT (OUTPATIENT)
Dept: GASTROENTEROLOGY | Age: 41
End: 2021-04-05
Payer: COMMERCIAL

## 2021-04-05 VITALS
HEIGHT: 61 IN | DIASTOLIC BLOOD PRESSURE: 60 MMHG | HEART RATE: 110 BPM | WEIGHT: 132.6 LBS | BODY MASS INDEX: 25.04 KG/M2 | TEMPERATURE: 98.3 F | SYSTOLIC BLOOD PRESSURE: 110 MMHG

## 2021-04-05 DIAGNOSIS — K76.89 LIVER CYST: ICD-10-CM

## 2021-04-05 DIAGNOSIS — K21.9 GASTROESOPHAGEAL REFLUX DISEASE WITHOUT ESOPHAGITIS: ICD-10-CM

## 2021-04-05 DIAGNOSIS — K76.0 FATTY LIVER: ICD-10-CM

## 2021-04-05 DIAGNOSIS — R14.0 BLOATING: Primary | ICD-10-CM

## 2021-04-05 PROCEDURE — 99212 OFFICE O/P EST SF 10 MIN: CPT | Performed by: NURSE PRACTITIONER

## 2021-04-05 RX ORDER — BUSPIRONE HYDROCHLORIDE 15 MG/1
15 TABLET ORAL 2 TIMES DAILY
COMMUNITY

## 2021-04-05 RX ORDER — VENLAFAXINE HYDROCHLORIDE 75 MG/1
75 CAPSULE, EXTENDED RELEASE ORAL DAILY
COMMUNITY
Start: 2021-03-08

## 2021-04-05 RX ORDER — CHOLECALCIFEROL (VITAMIN D3) 125 MCG
1 CAPSULE ORAL DAILY
Qty: 1 CAPSULE | Refills: 5 | Status: SHIPPED | OUTPATIENT
Start: 2021-04-05 | End: 2021-04-07 | Stop reason: SDUPTHER

## 2021-04-05 RX ORDER — CHOLECALCIFEROL (VITAMIN D3) 125 MCG
1 CAPSULE ORAL DAILY
COMMUNITY
End: 2021-04-05 | Stop reason: SDUPTHER

## 2021-04-05 RX ORDER — OMEPRAZOLE 20 MG/1
20 CAPSULE, DELAYED RELEASE ORAL DAILY
Qty: 30 CAPSULE | Refills: 5 | Status: SHIPPED | OUTPATIENT
Start: 2021-04-05

## 2021-04-05 NOTE — PROGRESS NOTES
Ariane Bustamante 38 y.o. female was seen by BOBBY Hernandez on 4/5/2021     Wt Readings from Last 3 Encounters:   04/05/21 132 lb 9.6 oz (60.1 kg)   01/11/21 154 lb (69.9 kg)   01/04/21 154 lb 9.6 oz (70.1 kg)       HPI  Ariane Bustamante is a pleasant 38 y.o.  female who presents today for follow-up on acid reflux, bloating, fatty liver and history of small intestinal bacterial overgrowth. Her EGD done by Dr. Jackelyn Lewis on 12- showed normal esophagus, small hiatal hernia, and mild gastritis. Her stomach biopsies showed reactive gastropathy with no evidence of H. Pylori infection. Her abdominal ultrasound done on 11-4-2020 showed fatty liver and 0.5 cm hyperechoic lesion in the right lobe of the liver. Her MRI of abdomen done on 11- showed the 5 mm sonographic right hepatic lobe lesion reflects a benign cavernous hemangioma. She was treated for small intestinal bacterial overgrowth in December 2020 with good results. She reports feeling good. Her appetite is good and she has intentionally lost twenty five pounds. She has restricted her diet to gluten free and very low carbohydrates. No nausea or vomiting. No abdominal pain, bloating or distention. She is taking Probiotic with good results. She mentioned if she eats gluten she will have bloating. Her heartburn and acid reflux is controlled with Prilosec. No nocturnal awakenings with acid reflux. No dysphagia or pain with swallowing. No excess belching or flatulence. Her typical bowel pattern is daily with soft brown formed stools.  She takes a stool softener daily.  No diarrhea or constipation.  No blood in her stools or black tarry stools.  She was adopted and uncertain of her family history for stomach or colon cancer. ROS  Review of Systems   Constitutional: Negative for chills, diaphoresis and fever. HENT: Positive for hearing loss. Negative for ear pain and tinnitus. Eyes: Positive for visual disturbance. Negative for photophobia and pain. Respiratory: Positive for cough. Negative for shortness of breath and wheezing. Cardiovascular: Negative for chest pain, palpitations and leg swelling. Gastrointestinal: Negative for abdominal pain, blood in stool, constipation, diarrhea, nausea and vomiting. Endocrine: Negative for cold intolerance, heat intolerance and polydipsia. Genitourinary: Negative for dysuria, frequency and urgency. Musculoskeletal: Positive for back pain, myalgias and neck pain. Skin: Negative for color change, pallor and rash. Allergic/Immunologic: Negative for environmental allergies and food allergies. Neurological: Positive for headaches. Negative for dizziness, seizures and weakness. Hematological: Bruises/bleeds easily. Psychiatric/Behavioral: Positive for dysphoric mood and sleep disturbance. Negative for suicidal ideas. The patient is nervous/anxious.           Allergies  Allergies   Allergen Reactions    Lexapro [Escitalopram]      \"Shot Up My Blood Pressure And Pulse, Dizziness Heart Palpitations\"    Cymbalta [Duloxetine Hcl] Other (See Comments)     Can't remember    Cortisone Swelling     Injection into the right knee.  Steroid injection flare with unable to ambulate, large amounts of swelling    Zoloft [Sertraline Hcl] Nausea And Vomiting       Medications  Current Outpatient Medications   Medication Sig Dispense Refill    busPIRone (BUSPAR) 15 MG tablet Take 15 mg by mouth 2 times daily      venlafaxine (EFFEXOR XR) 75 MG extended release capsule Take 75 mg by mouth daily      Lactobacillus (PROBIOTIC ACIDOPHILUS) CAPS Take 1 capsule by mouth daily      cyclobenzaprine (FLEXERIL) 10 MG tablet Take 10 mg by mouth 2 times daily as needed      gabapentin (NEURONTIN) 300 MG capsule TAKE 1 CAPSULE BY MOUTH THREE TIMES A DAY      omeprazole (PRILOSEC) 20 MG delayed release capsule Take 1 capsule by mouth Daily 30 capsule 3    HYDROcodone-acetaminophen (NORCO) 5-325 MG per tablet Take 1 tablet by mouth 2 times daily as needed for Pain.  albuterol (PROVENTIL HFA) 108 (90 BASE) MCG/ACT inhaler Inhale 2 puffs into the lungs every 4 hours as needed for Wheezing or Shortness of Breath for 30 days. With spacer (and mask if indicated). Thanks. 1 Inhaler 1     No current facility-administered medications for this visit. Past medical history:   She has a past medical history of Anesthesia, Anxiety, Arthritis, Asthma, Bronchitis, Chronic back pain, Cracked tooth, Depression, Heart murmur, Heartburn, History of blood transfusion, [place] (hard of hearing), Irritable bowel disease, Migraines, Panic attacks, Pneumonia, Shortness of breath on exertion, Teeth missing, Urinary, incontinence, stress female, Uses contact lenses, and Wears glasses. Past surgical history:  She has a past surgical history that includes Dilation and curettage of uterus (6-20-12); Tubal ligation (2001); [place] tooth extraction; Dental surgery; Tonsillectomy and adenoidectomy (1992); laparoscopy (6-20-12); laparoscopy (11-12); HYSTERECTOMY VAGINAL (05/2013); Knee arthroscopy (Left); back surgery; hernia repair (9-14); hernia repair (05/15/2015); hernia repair (09/08/2020); ventral hernia repair (Left, 9/8/2020); Endoscopy, colon, diagnostic (12/23/2020); and Upper gastrointestinal endoscopy (N/A, 12/23/2020). Social History:  She reports that she has been smoking cigarettes. She started smoking about 27 years ago. She has a 23.00 pack-year smoking history. She has never used smokeless tobacco. She reports current alcohol use. She reports that she does not use drugs. Family history:  Her family history includes Arthritis in her brother; Cancer in her father; Hearing Loss in her daughter; Kidney Disease in her son; Other in her father and son; Scoliosis in her daughter; Vision Loss in her son. She was adopted.     Objective    Vitals:    04/05/21 1405   BP: 110/60   Pulse: 110   Temp: 98.3 °F (36.8 °C) Physical exam    Physical Exam  Vitals signs reviewed. Constitutional:       General: She is not in acute distress. Appearance: Normal appearance. She is well-developed. She is not ill-appearing, toxic-appearing or diaphoretic. HENT:      Head: Normocephalic and atraumatic. Nose: Nose normal.      Mouth/Throat:      Mouth: Mucous membranes are moist.   Eyes:      Conjunctiva/sclera: Conjunctivae normal.      Pupils: Pupils are equal, round, and reactive to light. Neck:      Musculoskeletal: Normal range of motion and neck supple. Thyroid: No thyromegaly. Vascular: No JVD. Trachea: No tracheal deviation. Cardiovascular:      Rate and Rhythm: Normal rate and regular rhythm. Pulses: Normal pulses. Heart sounds: Normal heart sounds. No murmur. No friction rub. No gallop. Pulmonary:      Effort: Pulmonary effort is normal. No respiratory distress. Breath sounds: Normal breath sounds. No stridor. No wheezing, rhonchi or rales. Chest:      Chest wall: No tenderness. Abdominal:      General: Bowel sounds are normal. There is no distension. Palpations: Abdomen is soft. There is no mass. Tenderness: There is no abdominal tenderness. There is no guarding or rebound. Hernia: No hernia is present. Musculoskeletal: Normal range of motion. Lymphadenopathy:      Cervical: No cervical adenopathy. Skin:     General: Skin is warm and dry. Neurological:      Mental Status: She is alert and oriented to person, place, and time. Psychiatric:         Mood and Affect: Mood normal.         No visits with results within 2 Month(s) from this visit.    Latest known visit with results is:   Hospital Outpatient Visit on 12/16/2020   Component Date Value Ref Range Status    Source 12/16/2020 VIRAL SWAB   Final    SARS-CoV-2 12/16/2020 NOT DETECTED  NOT DETECTED Final    Comment: (NOTE)  This nucleic acid amplification test was developed and its  performance characteristics determined by [employer]. Nucleic acid amplification tests include PCR and TMA. This test has  not been FDA cleared or approved. This test has been authorized by  FDA under an Emergency Use Authorization (EUA). This test is only  authorized for the duration of time the declaration that  circumstances exist justifying the authorization of the emergency use  of in vitro diagnostic tests for detection of SARS-CoV-2 virus and/or  diagnosis of COVID-19 infection under section 564(b)(1) of the Act,  21 U. S.C. 282PFC-6(I) (1), unless the authorization is terminated or  revoked sooner. When diagnostic testing is negative, the possibility of a false  negative result should be considered in the context of a patient's  recent exposures and the presence of clinical signs and symptoms  consistent with COVID-19. An individual without symptoms of COVID-  19 and who is not shedding SARS-CoV-2 vi                           eul[place] would expect to have a  negative (not detected) result in this assay. Performed At: [address] [id number]  Sean Meza MD [place]:[id number]         Assessment and Plan:  1.  Abdominal bloating that has since resolved most likely diet related, gastritis, acid reflux induced, and/or multiple life stressors. Her small intestinal bacterial overgrowth was adequately treated with Cipro. Her symptoms have greatly improved with diet modification and weight loss. Her abdominal ultrasound and HIDA scan revealed no evidence of gallbladder disease.  The patient was instructed to avoid inflammatory foods like foods high in sugar and fat.  Recommend continue with weight loss, avoidance of NSAIDs and alcohol. Her  EGD showed small hiatal hernia, mild gastritis with no evidence of peptic ulcer disease or H. Pylori infection. Recommend smoking cessation. 2.  Gastritis most likely due to acid reflux.   The patient was encouraged to continue taking Prilosec 20 mg take once daily for treatment. Recommend diet modifications, stress reduction, weight loss and avoidance of foods that trigger. 3.  GERD that is stable without dysphagia or odynophagia.  The patient was encouraged to continue taking Prilosec 20 mg take once daily for the treatment of acid reflux. Her EGD showed no evidence of H. Pylori infection or Thompson's esophagus.  The patient was instructed to continue with anti-reflux measures and avoid foods that trigger.  Recommend smoking cessation. 4.  Fatty liver with liver cyst would recommend repeat abdominal ultrasound in December of 2021 and will discuss at our next visit. Abdominal ultrasound showed fatty liver continue with weight loss. Her MRI of abdomen done on 11- showed the 5 mm sonographic right hepatic lobe lesion reflects a benign cavernous hemangioma. Will order lab work to evaluate liver enzymes. 5.  The patient was encouraged to follow-up in 6 months.

## 2021-04-07 ENCOUNTER — TELEPHONE (OUTPATIENT)
Dept: GASTROENTEROLOGY | Age: 41
End: 2021-04-07

## 2021-04-07 RX ORDER — CHOLECALCIFEROL (VITAMIN D3) 125 MCG
1 CAPSULE ORAL DAILY
Qty: 1 CAPSULE | Refills: 5 | Status: SHIPPED | OUTPATIENT
Start: 2021-04-07 | End: 2021-04-08 | Stop reason: SDUPTHER

## 2021-04-08 RX ORDER — CHOLECALCIFEROL (VITAMIN D3) 125 MCG
1 CAPSULE ORAL DAILY
Qty: 30 CAPSULE | Refills: 5 | Status: SHIPPED | OUTPATIENT
Start: 2021-04-08 | End: 2021-06-17 | Stop reason: SDUPTHER

## 2021-04-14 ENCOUNTER — HOSPITAL ENCOUNTER (OUTPATIENT)
Age: 41
Discharge: HOME | End: 2021-04-14
Payer: COMMERCIAL

## 2021-04-14 LAB
ALBUMIN SERPL-MCNC: 4.1 GM/DL (ref 3.4–5)
ALP BLD-CCNC: 57 IU/L (ref 40–129)
ALT SERPL-CCNC: 13 U/L (ref 10–40)
ANION GAP SERPL CALCULATED.3IONS-SCNC: 8 MMOL/L (ref 4–16)
AST SERPL-CCNC: 16 IU/L (ref 15–37)
BASOPHILS ABSOLUTE: 0.1 K/CU MM
BASOPHILS RELATIVE PERCENT: 0.5 % (ref 0–1)
BILIRUB SERPL-MCNC: 0.3 MG/DL (ref 0–1)
BUN BLDV-MCNC: 13 MG/DL (ref 6–23)
CALCIUM SERPL-MCNC: 9.1 MG/DL (ref 8.3–10.6)
CHLORIDE BLD-SCNC: 102 MMOL/L (ref 99–110)
CO2: 26 MMOL/L (ref 21–32)
CREAT SERPL-MCNC: 0.7 MG/DL (ref 0.6–1.1)
DIFFERENTIAL TYPE: ABNORMAL
EOSINOPHILS ABSOLUTE: 0.1 K/CU MM
EOSINOPHILS RELATIVE PERCENT: 1 % (ref 0–3)
FOLATE: 2.9 NG/ML (ref 3.1–17.5)
GFR AFRICAN AMERICAN: >60 ML/MIN/1.73M2
GFR NON-AFRICAN AMERICAN: >60 ML/MIN/1.73M2
GLUCOSE BLD-MCNC: 91 MG/DL (ref 70–99)
HCT VFR BLD CALC: 46.9 % (ref 37–47)
HEMOGLOBIN: 14.7 GM/DL (ref 12.5–16)
IMMATURE NEUTROPHIL %: 0.3 % (ref 0–0.43)
LYMPHOCYTES ABSOLUTE: 2.2 K/CU MM
LYMPHOCYTES RELATIVE PERCENT: 24.1 % (ref 24–44)
MCH RBC QN AUTO: 32.2 PG (ref 27–31)
MCHC RBC AUTO-ENTMCNC: 31.3 % (ref 32–36)
MCV RBC AUTO: 102.9 FL (ref 78–100)
MONOCYTES ABSOLUTE: 0.6 K/CU MM
MONOCYTES RELATIVE PERCENT: 5.9 % (ref 0–4)
NUCLEATED RBC %: 0 %
PDW BLD-RTO: 15 % (ref 11.7–14.9)
PLATELET # BLD: 310 K/CU MM (ref 140–440)
PMV BLD AUTO: 12.7 FL (ref 7.5–11.1)
POTASSIUM SERPL-SCNC: 3.6 MMOL/L (ref 3.5–5.1)
RBC # BLD: 4.56 M/CU MM (ref 4.2–5.4)
SEGMENTED NEUTROPHILS ABSOLUTE COUNT: 6.3 K/CU MM
SEGMENTED NEUTROPHILS RELATIVE PERCENT: 68.2 % (ref 36–66)
SODIUM BLD-SCNC: 136 MMOL/L (ref 135–145)
TOTAL IMMATURE NEUTOROPHIL: 0.03 K/CU MM
TOTAL NUCLEATED RBC: 0 K/CU MM
TOTAL PROTEIN: 6.1 GM/DL (ref 6.4–8.2)
VITAMIN B-12: 594.3 PG/ML (ref 211–911)
VITAMIN D 25-HYDROXY: 48 NG/ML
WBC # BLD: 9.3 K/CU MM (ref 4–10.5)

## 2021-04-14 PROCEDURE — 82746 ASSAY OF FOLIC ACID SERUM: CPT

## 2021-04-14 PROCEDURE — 82607 VITAMIN B-12: CPT

## 2021-04-14 PROCEDURE — 82306 VITAMIN D 25 HYDROXY: CPT

## 2021-04-14 PROCEDURE — 36415 COLL VENOUS BLD VENIPUNCTURE: CPT

## 2021-04-14 PROCEDURE — 85025 COMPLETE CBC W/AUTO DIFF WBC: CPT

## 2021-04-14 PROCEDURE — 80053 COMPREHEN METABOLIC PANEL: CPT

## 2021-04-15 ENCOUNTER — TELEPHONE (OUTPATIENT)
Dept: GASTROENTEROLOGY | Age: 41
End: 2021-04-15

## 2021-04-15 RX ORDER — FOLIC ACID 1 MG/1
1 TABLET ORAL DAILY
Qty: 30 TABLET | Refills: 1 | Status: SHIPPED | OUTPATIENT
Start: 2021-04-15 | End: 2021-04-30 | Stop reason: SDUPTHER

## 2021-04-15 NOTE — TELEPHONE ENCOUNTER
Please call patient and notify her that her folic acid was mildly declined will order oral folic acid 1 mg take by mouth once daily for 8 weeks this has been sent to her [facility] pharmacy; ensure diet consists of food with folic acid (green leafy vegies and cereals/grains fortified with folic acid); avoid missing meals.

## 2021-04-16 NOTE — TELEPHONE ENCOUNTER
Patient called back. I informed her prescription was sent in and of her results. She verbally understood.

## 2021-04-30 RX ORDER — FOLIC ACID 1 MG/1
1 TABLET ORAL DAILY
Qty: 30 TABLET | Refills: 1 | Status: SHIPPED | OUTPATIENT
Start: 2021-04-30 | End: 2021-05-05 | Stop reason: SDUPTHER

## 2021-05-05 RX ORDER — FOLIC ACID 1 MG/1
1 TABLET ORAL DAILY
Qty: 30 TABLET | Refills: 1 | Status: SHIPPED | OUTPATIENT
Start: 2021-05-05 | End: 2021-07-17 | Stop reason: SDUPTHER

## 2021-06-17 ENCOUNTER — TELEPHONE (OUTPATIENT)
Dept: GASTROENTEROLOGY | Age: 41
End: 2021-06-17

## 2021-06-17 RX ORDER — CHOLECALCIFEROL (VITAMIN D3) 125 MCG
1 CAPSULE ORAL DAILY
Qty: 30 CAPSULE | Refills: 5 | Status: SHIPPED | OUTPATIENT
Start: 2021-06-17

## 2021-07-17 RX ORDER — FOLIC ACID 1 MG/1
1 TABLET ORAL DAILY
Qty: 30 TABLET | Refills: 1 | Status: SHIPPED | OUTPATIENT
Start: 2021-07-17

## 2021-08-31 NOTE — TELEPHONE ENCOUNTER
Please notify patient that folic acid was refilled. Speech Therapy    Patient not seen in therapy today.    Treatment on hold due to medical condition.      Additional details:  SLP discussed with RN and GI NP.  Pt is too lethargic for po trials at this time and NG to be placed for colonoscopy prep.  SLP will follow up after procedure.        OBJECTIVE                     Therapy procedure time and total treatment time can be found documented on the Time Entry flowsheet

## (undated) DEVICE — COUNTER NDL 30 COUNT FOAM STRP SGL MAG

## (undated) DEVICE — GLOVE SURG SZ 65 THK91MIL LTX FREE SYN POLYISOPRENE

## (undated) DEVICE — YANKAUER,FLEXIBLE HANDLE,REGLR CAPACITY: Brand: MEDLINE INDUSTRIES, INC.

## (undated) DEVICE — 4-PORT MANIFOLD: Brand: NEPTUNE 2

## (undated) DEVICE — SPONGE LAP W18XL18IN WHT COT 4 PLY FLD STRUNG RADPQ DISP ST

## (undated) DEVICE — MARKER SURG SKIN UTIL REGULAR/FINE 2 TIP W/ RUL AND 9 LBL

## (undated) DEVICE — APPLICATOR MEDICATED 26 CC SOLUTION HI LT ORNG CHLORAPREP

## (undated) DEVICE — INTENDED FOR TISSUE SEPARATION, AND OTHER PROCEDURES THAT REQUIRE A SHARP SURGICAL BLADE TO PUNCTURE OR CUT.: Brand: BARD-PARKER ® STAINLESS STEEL BLADES

## (undated) DEVICE — LINE SAMP O2 6.5FT W/FEMALE CONN F/ADULT CAPNOLINE PLUS

## (undated) DEVICE — FORCEPS BX L240CM JAW DIA2.8MM L CAP W/ NDL MIC MESH TOOTH

## (undated) DEVICE — ADHESIVE SKIN CLSR 0.7ML TOP DERMBND ADV

## (undated) DEVICE — SOLUTION IV IRRIG POUR BRL 0.9% SODIUM CHL 2F7124

## (undated) DEVICE — LINER SUCT CANSTR 1500CC SEMI RIG W/ POR HYDROPHOBIC SHUT

## (undated) DEVICE — PENCIL ES CRD L10FT HND SWCHING ROCK SWCH W/ EDGE COAT BLDE

## (undated) DEVICE — THE TORRENT IRRIGATION TUBING IS INTENDED TO PROVIDE IRRIGATION VIA IRRIGATION FLUIDS, SUCH AS STERILE WATER, DURING GASTROINTESTINAL ENDOSCOPIC PROCEDURES WHEN USED IN CONJUNCTION WITH AN IRRIGATION PUMP OR ELECTROSURGICAL UNIT.: Brand: TORRENT

## (undated) DEVICE — SUTURE PROL SZ 2-0 L36IN NONABSORBABLE BLU SH L26MM 1/2 CIR 8523H

## (undated) DEVICE — SPONGE GZ W4XL8IN COT WVN 12 PLY

## (undated) DEVICE — JELLY,LUBE,STERILE,FLIP TOP,TUBE,2-OZ: Brand: MEDLINE

## (undated) DEVICE — ELECTRODE ES L2.75IN S STL INSUL BLDE W/ SL EDGE

## (undated) DEVICE — ELECTRODE ES AD CRDLSS PT RET REM POLYHESIVE

## (undated) DEVICE — BITE BLOCK ENDOSCP AD 60 FR W/ ADJ STRP PLAS GRN BLOX

## (undated) DEVICE — THE TORRENT IRRIGATION SCOPE CONNECTOR IS USED WITH THE TORRENT IRRIGATION TUBING TO PROVIDE IRRIGATION FLUIDS SUCH AS STERILE WATER DURING GASTROINTESTINAL ENDOSCOPIC PROCEDURES WHEN USED IN CONJUNCTION WITH AN IRRIGATION PUMP (OR ELECTROSURGICAL UNIT).: Brand: TORRENT

## (undated) DEVICE — TOWEL,OR,DSP,ST,BLUE,STD,6/PK,12PK/CS: Brand: MEDLINE

## (undated) DEVICE — GOWN,ECLIPSE,POLYRNF,BRTHSLV,XL,30/CS: Brand: MEDLINE

## (undated) DEVICE — SOLUTION IV IRRIG WATER 1000ML POUR BRL 2F7114

## (undated) DEVICE — DEFENDO AIR WATER SUCTION AND BIOPSY VALVE KIT FOR  OLYMPUS: Brand: DEFENDO AIR/WATER/SUCTION AND BIOPSY VALVE

## (undated) DEVICE — SUTURE COAT VCRL SZ 4-0 L18IN ABSRB UD L19MM PS-2 1/2 CIR J496G

## (undated) DEVICE — GAUZE,SPONGE,4"X4",16PLY,XRAY,STRL,LF: Brand: MEDLINE

## (undated) DEVICE — PAD,ABDOMINAL,5"X9",ST,LF,25/BX: Brand: MEDLINE INDUSTRIES, INC.

## (undated) DEVICE — TUBING, SUCTION, 1/4" X 10', STRAIGHT: Brand: MEDLINE

## (undated) DEVICE — TUBING, SUCTION, 3/16" X 10', STRAIGHT: Brand: MEDLINE

## (undated) DEVICE — SUTURE VCRL SZ 3-0 L27IN ABSRB UD L26MM SH 1/2 CIR J416H

## (undated) DEVICE — GLOVE SURG SZ 65 CRM LTX FREE POLYISOPRENE POLYMER BEAD ANTI

## (undated) DEVICE — ELECTRODE,RADIOTRANSLUCENT,FOAM,5PK: Brand: MEDLINE